# Patient Record
Sex: FEMALE | Race: WHITE | NOT HISPANIC OR LATINO | URBAN - METROPOLITAN AREA
[De-identification: names, ages, dates, MRNs, and addresses within clinical notes are randomized per-mention and may not be internally consistent; named-entity substitution may affect disease eponyms.]

---

## 2018-02-07 ENCOUNTER — HOSPITAL ENCOUNTER (EMERGENCY)
Facility: HOSPITAL | Age: 48
Discharge: HOME | End: 2018-02-07
Attending: EMERGENCY MEDICINE | Admitting: EMERGENCY MEDICINE
Payer: COMMERCIAL

## 2018-02-07 ENCOUNTER — APPOINTMENT (EMERGENCY)
Dept: RADIOLOGY | Facility: HOSPITAL | Age: 48
End: 2018-02-07
Payer: COMMERCIAL

## 2018-02-07 VITALS
OXYGEN SATURATION: 100 % | HEART RATE: 93 BPM | SYSTOLIC BLOOD PRESSURE: 112 MMHG | TEMPERATURE: 97 F | DIASTOLIC BLOOD PRESSURE: 72 MMHG | RESPIRATION RATE: 17 BRPM | WEIGHT: 162.48 LBS

## 2018-02-07 DIAGNOSIS — F11.23 HEROIN WITHDRAWAL (HCC): Primary | ICD-10-CM

## 2018-02-07 LAB
ANION GAP SERPL CALCULATED.3IONS-SCNC: 11 MMOL/L (ref 4–13)
ATRIAL RATE: 104 BPM
BASOPHILS # BLD AUTO: 0.02 THOUSANDS/ΜL (ref 0–0.1)
BASOPHILS NFR BLD AUTO: 0 % (ref 0–1)
BUN SERPL-MCNC: 19 MG/DL (ref 5–25)
CALCIUM SERPL-MCNC: 9.4 MG/DL (ref 8.3–10.1)
CHLORIDE SERPL-SCNC: 100 MMOL/L (ref 100–108)
CO2 SERPL-SCNC: 29 MMOL/L (ref 21–32)
CREAT SERPL-MCNC: 0.8 MG/DL (ref 0.6–1.3)
EOSINOPHIL # BLD AUTO: 0.08 THOUSAND/ΜL (ref 0–0.61)
EOSINOPHIL NFR BLD AUTO: 1 % (ref 0–6)
ERYTHROCYTE [DISTWIDTH] IN BLOOD BY AUTOMATED COUNT: 14.1 % (ref 11.6–15.1)
GFR SERPL CREATININE-BSD FRML MDRD: 87 ML/MIN/1.73SQ M
GLUCOSE SERPL-MCNC: 89 MG/DL (ref 65–140)
HCT VFR BLD AUTO: 42.8 % (ref 34.8–46.1)
HGB BLD-MCNC: 12.9 G/DL (ref 11.5–15.4)
LYMPHOCYTES # BLD AUTO: 1.6 THOUSANDS/ΜL (ref 0.6–4.47)
LYMPHOCYTES NFR BLD AUTO: 21 % (ref 14–44)
MCH RBC QN AUTO: 26.3 PG (ref 26.8–34.3)
MCHC RBC AUTO-ENTMCNC: 30.1 G/DL (ref 31.4–37.4)
MCV RBC AUTO: 87 FL (ref 82–98)
MONOCYTES # BLD AUTO: 1.01 THOUSAND/ΜL (ref 0.17–1.22)
MONOCYTES NFR BLD AUTO: 13 % (ref 4–12)
NEUTROPHILS # BLD AUTO: 5.04 THOUSANDS/ΜL (ref 1.85–7.62)
NEUTS SEG NFR BLD AUTO: 65 % (ref 43–75)
NRBC BLD AUTO-RTO: 0 /100 WBCS
PLATELET # BLD AUTO: 283 THOUSANDS/UL (ref 149–390)
PMV BLD AUTO: 9.9 FL (ref 8.9–12.7)
POTASSIUM SERPL-SCNC: 3.7 MMOL/L (ref 3.5–5.3)
PR INTERVAL: 180 MS
QRS AXIS: 22 DEGREES
QRSD INTERVAL: 118 MS
QT INTERVAL: 346 MS
QTC INTERVAL: 454 MS
RBC # BLD AUTO: 4.9 MILLION/UL (ref 3.81–5.12)
SODIUM SERPL-SCNC: 140 MMOL/L (ref 136–145)
T WAVE AXIS: -88 DEGREES
TROPONIN I SERPL-MCNC: <0.02 NG/ML
VENTRICULAR RATE: 104 BPM
WBC # BLD AUTO: 7.78 THOUSAND/UL (ref 4.31–10.16)

## 2018-02-07 PROCEDURE — 93005 ELECTROCARDIOGRAM TRACING: CPT

## 2018-02-07 PROCEDURE — 96374 THER/PROPH/DIAG INJ IV PUSH: CPT

## 2018-02-07 PROCEDURE — 93010 ELECTROCARDIOGRAM REPORT: CPT | Performed by: INTERNAL MEDICINE

## 2018-02-07 PROCEDURE — 71045 X-RAY EXAM CHEST 1 VIEW: CPT

## 2018-02-07 PROCEDURE — 84484 ASSAY OF TROPONIN QUANT: CPT | Performed by: EMERGENCY MEDICINE

## 2018-02-07 PROCEDURE — 96375 TX/PRO/DX INJ NEW DRUG ADDON: CPT

## 2018-02-07 PROCEDURE — 80048 BASIC METABOLIC PNL TOTAL CA: CPT | Performed by: EMERGENCY MEDICINE

## 2018-02-07 PROCEDURE — 96376 TX/PRO/DX INJ SAME DRUG ADON: CPT

## 2018-02-07 PROCEDURE — 99285 EMERGENCY DEPT VISIT HI MDM: CPT

## 2018-02-07 PROCEDURE — 85025 COMPLETE CBC W/AUTO DIFF WBC: CPT | Performed by: EMERGENCY MEDICINE

## 2018-02-07 PROCEDURE — 36415 COLL VENOUS BLD VENIPUNCTURE: CPT | Performed by: EMERGENCY MEDICINE

## 2018-02-07 RX ORDER — MYCOPHENOLATE MOFETIL 250 MG/1
1000 CAPSULE ORAL DAILY
COMMUNITY

## 2018-02-07 RX ORDER — CLONIDINE HYDROCHLORIDE 0.1 MG/1
0.2 TABLET ORAL ONCE
Status: COMPLETED | OUTPATIENT
Start: 2018-02-07 | End: 2018-02-07

## 2018-02-07 RX ORDER — FLUCONAZOLE 200 MG/1
200 TABLET ORAL DAILY
COMMUNITY

## 2018-02-07 RX ORDER — PREDNISONE 10 MG/1
TABLET ORAL DAILY
COMMUNITY

## 2018-02-07 RX ORDER — POTASSIUM CHLORIDE 20 MEQ/1
20 TABLET, EXTENDED RELEASE ORAL 2 TIMES DAILY
COMMUNITY

## 2018-02-07 RX ORDER — METHADONE HYDROCHLORIDE 10 MG/1
20 TABLET ORAL ONCE
Status: COMPLETED | OUTPATIENT
Start: 2018-02-07 | End: 2018-02-07

## 2018-02-07 RX ORDER — LAMOTRIGINE 200 MG/1
25 TABLET ORAL DAILY
COMMUNITY

## 2018-02-07 RX ORDER — DIPHENHYDRAMINE HYDROCHLORIDE 50 MG/ML
25 INJECTION INTRAMUSCULAR; INTRAVENOUS ONCE
Status: COMPLETED | OUTPATIENT
Start: 2018-02-07 | End: 2018-02-07

## 2018-02-07 RX ORDER — FUROSEMIDE 20 MG/1
20 TABLET ORAL DAILY
COMMUNITY

## 2018-02-07 RX ORDER — DIAZEPAM 5 MG/ML
2.5 INJECTION, SOLUTION INTRAMUSCULAR; INTRAVENOUS ONCE
Status: COMPLETED | OUTPATIENT
Start: 2018-02-07 | End: 2018-02-07

## 2018-02-07 RX ORDER — BUPROPION HYDROCHLORIDE 150 MG/1
150 TABLET, EXTENDED RELEASE ORAL 2 TIMES DAILY
COMMUNITY

## 2018-02-07 RX ORDER — LIOTHYRONINE SODIUM 5 UG/1
5 TABLET ORAL DAILY
COMMUNITY

## 2018-02-07 RX ORDER — VENLAFAXINE HYDROCHLORIDE 75 MG/1
75 CAPSULE, EXTENDED RELEASE ORAL DAILY
COMMUNITY

## 2018-02-07 RX ORDER — LEVOTHYROXINE AND LIOTHYRONINE 76; 18 UG/1; UG/1
120 TABLET ORAL DAILY
COMMUNITY

## 2018-02-07 RX ORDER — DIAZEPAM 5 MG/ML
5 INJECTION, SOLUTION INTRAMUSCULAR; INTRAVENOUS ONCE
Status: COMPLETED | OUTPATIENT
Start: 2018-02-07 | End: 2018-02-07

## 2018-02-07 RX ADMIN — DIAZEPAM 5 MG: 5 INJECTION, SOLUTION INTRAMUSCULAR; INTRAVENOUS at 04:48

## 2018-02-07 RX ADMIN — METHADONE HYDROCHLORIDE 20 MG: 10 TABLET ORAL at 04:48

## 2018-02-07 RX ADMIN — DIPHENHYDRAMINE HYDROCHLORIDE 25 MG: 50 INJECTION, SOLUTION INTRAMUSCULAR; INTRAVENOUS at 04:13

## 2018-02-07 RX ADMIN — DIAZEPAM 5 MG: 5 INJECTION, SOLUTION INTRAMUSCULAR; INTRAVENOUS at 06:11

## 2018-02-07 RX ADMIN — CLONIDINE HYDROCHLORIDE 0.2 MG: 0.1 TABLET ORAL at 04:02

## 2018-02-07 RX ADMIN — DIPHENHYDRAMINE HYDROCHLORIDE 25 MG: 50 INJECTION, SOLUTION INTRAMUSCULAR; INTRAVENOUS at 03:58

## 2018-02-07 RX ADMIN — DIAZEPAM 2.5 MG: 5 INJECTION, SOLUTION INTRAMUSCULAR; INTRAVENOUS at 04:11

## 2018-02-07 RX ADMIN — DIAZEPAM 2.5 MG: 5 INJECTION, SOLUTION INTRAMUSCULAR; INTRAVENOUS at 03:57

## 2018-02-07 NOTE — DISCHARGE INSTRUCTIONS
Opioid Withdrawal   WHAT YOU NEED TO KNOW:   Opioid withdrawal is a group of symptoms that occur when you suddenly decrease or stop taking opioids  Opioids include medicines to control pain, such as morphine and codeine, and illegal drugs, such as heroin  Withdrawal symptoms occur if you are physically dependent on opioids  Dependence means that your body gets used to how much medicine you take  This happens after you have used opioids regularly for a long time  Addiction means that a person uses opioids to get high instead of using them to control pain  DISCHARGE INSTRUCTIONS:   Medicines: You may  need any of the following:  · Opioid medicine  may still be needed if you have chronic pain  Your healthcare provider will tell you if you need to continue taking an opioid and explain how you should take it  · NSAIDs , such as ibuprofen, help decrease swelling, pain, and fever  This medicine is available with or without a doctor's order  NSAIDs can cause stomach bleeding or kidney problems in certain people  If you take blood thinner medicine, always ask your healthcare provider if NSAIDs are safe for you  Always read the medicine label and follow directions  · Blood pressure medicine  decreases symptoms of withdrawal, such as nausea, vomiting, muscle tension, and anxiety  · Antianxiety medicine  decreases anxiety and helps you feel calm and relaxed  · Antinausea medicine  helps calm your stomach and prevent vomiting  · Take your medicine as directed  Contact your healthcare provider if you think your medicine is not helping or if you have side effects  Tell him of her if you are allergic to any medicine  Keep a list of the medicines, vitamins, and herbs you take  Include the amounts, and when and why you take them  Bring the list or the pill bottles to follow-up visits  Carry your medicine list with you in case of an emergency  Follow up with your healthcare provider as directed:   You may need to return for other tests  You may also be referred to a specialty clinic to receive maintenance therapy medicine on a regular basis  Write down your questions so you remember to ask them during your visits  Psychological counseling and support:  Counseling and support may be provided to you if you are dependent on opioids  Healthcare providers will speak with you about your opioid use  They may also help you find resources for any daily living needs you have, such as housing or employment  Contact your healthcare provider if:   · You are about to run out of your opioid medicine  · You have nausea and vomiting  · You have questions or concerns about your condition or care  Return to the emergency department if:   · You have a fast heartbeat  · You have a seizure  © 2017 [address] Information is for End User's use only and may not be sold, redistributed or otherwise used for commercial purposes  All illustrations and images included in CareNotes® are the copyrighted property of A D A M , Inc  or Robb Gilliam  The above information is an  only  It is not intended as medical advice for individual conditions or treatments  Talk to your doctor, nurse or pharmacist before following any medical regimen to see if it is safe and effective for you  Narcotic Abuse   WHAT YOU NEED TO KNOW:   Narcotics are medicines used to decrease or take away severe pain  Narcotics may also be called opioids  Some common names of narcotics ordered by a doctor are codeine and morphine  Heroin is an illegal street drug that is made from morphine  DISCHARGE INSTRUCTIONS:   Seek care immediately if:   · You are very drowsy  · Your speech is slurred  · You have trouble thinking, remembering things, or focusing  Contact your healthcare provider if:   · You want help or information on how to stop using or abusing narcotics      Follow up with your healthcare provider as directed:  Write down your questions so you remember to ask them during your visits  Narcotic intoxication  usually lasts for several hours  You may have the following during or after you use narcotics:  · Behavior or mood changes, such as a great feeling followed by the feeling that you do not care about anyone or anything    · Trouble thinking, remembering things, or focusing    · Small pupils    · Feeling very drowsy    · Slurred speech  Narcotic withdrawal  occurs if you stop using narcotics after using them heavily over a period of time  Signs and symptoms may begin within minutes or days and continue for days or even months:  · Depression and anxiety    · Nausea or vomiting    · Muscle aches    · Watery eyes or runny nose    · Large pupils    · Sweating or goosebumps on your skin    · Diarrhea    · Fever    · Trouble sleeping  How narcotics can harm a pregnant woman and her baby:   · Tell your healthcare provider right away if you are trying to get pregnant or you are pregnant and you are using narcotics  Your doctor may suggest other medicines to control pain and prevent withdrawal  If you go through withdrawal while pregnant, you may miscarry your baby, or he may be stillborn  He may be very small and have other medical problems  · When your baby is born, he may show signs of withdrawal  This includes unexpected weight loss, poor feeding, and more crying than normal  Your baby may also have a fever, vomit, and have diarrhea  He may also have learning problems or other health issues when he gets older  If you have a baby and you are using narcotics, you may have trouble caring for your baby  Narcotics may be passed to your baby through breast milk  Talk to your healthcare provider before breastfeeding your baby if you are using narcotics    For support and more information:   · Substance Abuse and [address] , [address] [place]  Web Address: https://Drill Map/  · [facility] on Drug Abuse  [address] [place], [place] [address]  Phone: 5- 487 - 107-8742  Web Address: www arelis nih gov  © 2017 [address]  Information is for End User's use only and may not be sold, redistributed or otherwise used for commercial purposes  All illustrations and images included in CareNotes® are the copyrighted property of [employer]  or [address]  The above information is an  only  It is not intended as medical advice for individual conditions or treatments  Talk to your doctor, nurse or pharmacist before following any medical regimen to see if it is safe and effective for you

## 2018-02-07 NOTE — ED NOTES
Patient began to state that the medication was not working and she wanted to call her friend to go home  Dr Rita Lundborg made aware  Discussed with patient, [facility] and myself her options of staying to wait for crisis to come in the morning for resources for the patient or to set up rehab if the patient wished  Patient agreed to wait until morning        Arvind Richey RN  02/07/18 2019

## 2018-02-07 NOTE — ED NOTES
Pt asleep in bed at this time  Pt is twitching and mumbling in her sleep  No signs of distress noted at this time  Will continue to monitor        Savi Porras  02/07/18 0802

## 2018-02-07 NOTE — ED NOTES
Patient brought in by EMS  Patient reports she uses 16 bags of heroin a day  Patient reports last use was 18 hours ago  Patient states she does not want her family to know and she is unsure if she wants recovery        Gigi Cartwright RN  02/07/18 0941

## 2018-02-07 NOTE — ED PROVIDER NOTES
History  Chief Complaint   Patient presents with    Withdrawal - Drug     Patient brought in by EMS for heroin withdrawal, patient restless     64-year-old female presents with concerns for acute opiate withdrawal   Patient states she typically use numerous bags of heroin per day, has not used heroin in the past 18 hours  Patient typically snorts heroin  Patient notes severe restlessness, diaphoresis, dyskinesia, lacrimation, nausea, and abdominal cramping  Patient has a history of pulmonary fibrosis and is oxygen dependent on 4 L nasal cannula  Impression and plan:  Heroin withdrawal   Will treat patient symptomatically with benzodiazepines, diphenhydramine, and clonidine  I have asked patient if she wishes to go on to drug rehabilitation  She is currently contemplating this  Will check chest x-ray considering history of pulmonary fibrosis and evaluate with troponin for potential ACS is alternative of these are less likely considering history and evaluation completed thus far  Will check BMP to evaluate for potential renal dysfunction  Withdrawal - Drug   Severity:  Severe  Onset quality:  Gradual  Timing:  Constant  Chronicity:  New  Suspected agents:  Heroin  Associated symptoms: abdominal pain, nausea, palpitations, shortness of breath and weakness    Associated symptoms: no agitation, no blackouts, no bladder incontinence, no bowel incontinence, no confusion, no hallucinations, no headaches, no loss of consciousness, no seizures, no somnolence, no suicidal ideation, no violence and no vomiting        Prior to Admission Medications   Prescriptions Last Dose Informant Patient Reported? Taking?    buPROPion (WELLBUTRIN SR) 150 mg 12 hr tablet   Yes Yes   Sig: Take 150 mg by mouth 2 (two) times a day   fluconazole (DIFLUCAN) 200 mg tablet   Yes Yes   Sig: Take 200 mg by mouth daily   furosemide (LASIX) 20 mg tablet   Yes Yes   Sig: Take 20 mg by mouth daily   lamoTRIgine (LaMICtal) 200 MG tablet   Yes Yes   Sig: Take 25 mg by mouth daily   liothyronine (CYTOMEL) 5 mcg tablet   Yes Yes   Sig: Take 5 mcg by mouth daily   metFORMIN (GLUCOPHAGE) 500 mg tablet   Yes Yes   Sig: Take 500 mg by mouth 2 (two) times a day with meals   mycophenolate (CELLCEPT) 250 mg capsule   Yes Yes   Sig: Take 1,000 mg by mouth daily   potassium chloride (K-DUR,KLOR-CON) 20 mEq tablet   Yes Yes   Sig: Take 20 mEq by mouth 2 (two) times a day   predniSONE 10 mg tablet   Yes Yes   Sig: Take by mouth daily   thyroid (ARMOUR) 120 MG tablet   Yes Yes   Sig: Take 120 mg by mouth daily   venlafaxine (EFFEXOR-XR) 75 mg 24 hr capsule   Yes Yes   Sig: Take 75 mg by mouth daily      Facility-Administered Medications: None       Past Medical History:   Diagnosis Date    Disease of thyroid gland     Emphysema lung (HCC)     Pulmonary fibrosis ([facility] [address] )        Past Surgical History:   Procedure Laterality Date    HYSTERECTOMY         History reviewed  No pertinent family history  I have reviewed and agree with the history as documented  Social History   Substance Use Topics    Smoking status: Current Every Day Smoker     Types: Cigarettes    Smokeless tobacco: Never Used    Alcohol use No        Review of Systems   Respiratory: Positive for shortness of breath  Cardiovascular: Positive for palpitations  Gastrointestinal: Positive for abdominal pain and nausea  Negative for bowel incontinence and vomiting  Genitourinary: Negative for bladder incontinence  Neurological: Positive for weakness  Negative for seizures, loss of consciousness and headaches  Psychiatric/Behavioral: Negative for agitation, confusion, hallucinations and suicidal ideas         Physical Exam  ED Triage Vitals   Temperature Pulse Respirations Blood Pressure SpO2   02/07/18 0451 02/07/18 0346 02/07/18 0346 02/07/18 0346 02/07/18 0346   (!) 97 °F (36 1 °C) (!) 114 22 (!) 184/94 100 %      Temp Source Heart Rate Source Patient Position - Orthostatic VS BP Location FiO2 (%)   02/07/18 0451 02/07/18 0346 02/07/18 0346 02/07/18 0346 --   Axillary Monitor Lying Right arm       Pain Score       02/07/18 0603       No Pain           Orthostatic Vital Signs  Vitals:    02/07/18 0703 02/07/18 0736 02/07/18 0800 02/07/18 0855   BP: 122/75 140/73 122/77 112/72   Pulse: 81 83 84 93   Patient Position - Orthostatic VS: Lying Lying Lying Lying       Physical Exam   Constitutional: She is oriented to person, place, and time  She appears well-developed and well-nourished  She appears distressed  HENT:   Head: Normocephalic  Mouth/Throat: Oropharynx is clear and moist    Eyes: Pupils are equal, round, and reactive to light  Neck: Normal range of motion  Neck supple  Cardiovascular: Regular rhythm  Tachycardic  Pulmonary/Chest: Effort normal and breath sounds normal  No respiratory distress  She has no wheezes  Abdominal: Soft  Bowel sounds are normal  She exhibits no distension  There is no tenderness  Musculoskeletal: She exhibits no edema  Neurological: She is alert and oriented to person, place, and time  She displays normal reflexes  No sensory deficit  She exhibits normal muscle tone  Restless  Grossly normal motor function with no focal deficits  No clonus rigidity  Skin: Skin is warm  She is diaphoretic  Piloerection  Psychiatric: Her mood appears anxious  Vitals reviewed        ED Medications  Medications   cloNIDine (CATAPRES) tablet 0 2 mg (0 2 mg Oral Given 2/7/18 0402)   diazepam (VALIUM) injection 2 5 mg (2 5 mg Intravenous Given 2/7/18 0357)   diphenhydrAMINE (BENADRYL) injection 25 mg (25 mg Intravenous Given 2/7/18 0358)    EMS REPLENISHMENT MED ( Does not apply Given to EMS 2/7/18 0423)   diazepam (VALIUM) injection 2 5 mg (2 5 mg Intravenous Given 2/7/18 0411)   diphenhydrAMINE (BENADRYL) injection 25 mg (25 mg Intravenous Given 2/7/18 0413)   methadone (DOLOPHINE) tablet 20 mg (20 mg Oral Given 2/7/18 5428)   diazepam (VALIUM) injection 5 mg (5 mg Intravenous Given 2/7/18 3738)   diazepam (VALIUM) injection 5 mg (5 mg Intravenous Given 2/7/18 0611)       Diagnostic Studies  Results Reviewed     Procedure Component Value Units Date/Time    Troponin I [[id number]]  (Normal) Collected:  02/07/18 0406    Lab Status:  Final result Specimen:  Blood from Arm, Left Updated:  02/07/18 0437     Troponin I <0 02 ng/mL     Narrative:         [employer] Chemistry analyzer 99% cutoff is > 0 04 ng/mL in network labs    o cTnI 99% cutoff is useful only when applied to patients in the clinical setting of myocardial ischemia  o cTnI 99% cutoff should be interpreted in the context of clinical history, ECG findings and possibly cardiac imaging to establish correct diagnosis  o cTnI 99% cutoff may be suggestive but clearly not indicative of a coronary event without the clinical setting of myocardial ischemia  Basic metabolic panel [[id number]] Collected:  02/07/18 0406    Lab Status:  Final result Specimen:  Blood from Arm, Left Updated:  02/07/18 0428     Sodium 140 mmol/L      Potassium 3 7 mmol/L      Chloride 100 mmol/L      CO2 29 mmol/L      Anion Gap 11 mmol/L      BUN 19 mg/dL      Creatinine 0 80 mg/dL      Glucose 89 mg/dL      Calcium 9 4 mg/dL      eGFR 87 ml/min/1 73sq m     Narrative:         National Kidney Disease Education Program recommendations are as follows:  GFR calculation is accurate only with a steady state creatinine  Chronic Kidney disease less than 60 ml/min/1 73 sq  meters  Kidney failure less than 15 ml/min/1 73 sq  meters      CBC and differential [[id number]]  (Abnormal) Collected:  02/07/18 0406    Lab Status:  Final result Specimen:  Blood from Arm, Left Updated:  02/07/18 0417     WBC 7 78 Thousand/uL      RBC 4 90 Million/uL      Hemoglobin 12 9 g/dL      Hematocrit 42 8 %      MCV 87 fL      MCH 26 3 (L) pg      MCHC 30 1 (L) g/dL      RDW 14 1 %      MPV 9 9 fL      Platelets 556 Thousands/uL      nRBC 0 /100 WBCs Neutrophils Relative 65 %      Lymphocytes Relative 21 %      Monocytes Relative 13 (H) %      Eosinophils Relative 1 %      Basophils Relative 0 %      Neutrophils Absolute 5 04 Thousands/µL      Lymphocytes Absolute 1 60 Thousands/µL      Monocytes Absolute 1 01 Thousand/µL      Eosinophils Absolute 0 08 Thousand/µL      Basophils Absolute 0 02 Thousands/µL                  XR chest portable   Final Result by Carmenza Cancino MD (02/07 0740)      Limited examination demonstrating parenchymal airspace opacity in the left chest in the small left pleural effusion as well as interstitial prominence in the right lung with blunting in right costophrenic angle  Repeat evaluation with PA and left    lateral chest radiographs when possible is recommended  Workstation performed: [id number]                    Procedures  Procedures       Phone Contacts  ED Phone Contact    ED Course  ED Course as of Feb 08 0348   Wed Feb 07, 2018   0410   EKG demonstrates sinus tachycardia rate of 104  Normal QTC  Variable baseline due to patient's restlessness  No clear T-wave or ST changes notable on the EKG  1849   Patient with persistent symptoms despite multiple medications, will administer single dose methadone 20 mg oral after patient agreed to talk with crisis about potential options regarding rehabilitation  Patient states that she did take Suboxone prior to coming to the emergency room  which is likely exacerbating her symptoms  MDM  CritCare Time    Disposition  Final diagnoses:   Heroin withdrawal ([address] )     Time reflects when diagnosis was documented in both MDM as applicable and the Disposition within this note     Time User Action Codes Description Comment    2/7/2018  8:11 AM Tsosie Finder Add [P08 18] Heroin withdrawal [facility])       ED Disposition     ED Disposition Condition Comment    Discharge  Alysia Salas discharge to home/self care      Condition at discharge: Stable        MD Documentation    Alexandria Maxwell Most Recent Value   Sending MD Dr Nelly Blackwell up With Specialties Details Why Contact Info Additional Information    Infolink  Call To establish care with a primary care doctor if you do not already have one [phone]       Drug rehab  Call  using list of outpatient drug rehab resources provided by crisis worker     [place] [facility] Emergency Department Emergency Medicine Go to If symptoms worsen [address] [facility] [id number] [phone] [place] ED, [address] [place] [address], [id number]        Discharge Medication List as of 2/7/2018  8:46 AM      CONTINUE these medications which have NOT CHANGED    Details   buPROPion (WELLBUTRIN SR) 150 mg 12 hr tablet Take 150 mg by mouth 2 (two) times a day, Historical Med      fluconazole (DIFLUCAN) 200 mg tablet Take 200 mg by mouth daily, Historical Med      furosemide (LASIX) 20 mg tablet Take 20 mg by mouth daily, Historical Med      lamoTRIgine (LaMICtal) 200 MG tablet Take 25 mg by mouth daily, Historical Med      liothyronine (CYTOMEL) 5 mcg tablet Take 5 mcg by mouth daily, Historical Med      metFORMIN (GLUCOPHAGE) 500 mg tablet Take 500 mg by mouth 2 (two) times a day with meals, Historical Med      mycophenolate (CELLCEPT) 250 mg capsule Take 1,000 mg by mouth daily, Historical Med      potassium chloride (K-DUR,KLOR-CON) 20 mEq tablet Take 20 mEq by mouth 2 (two) times a day, Historical Med      predniSONE 10 mg tablet Take by mouth daily, Historical Med      thyroid (ARMOUR) 120 MG tablet Take 120 mg by mouth daily, Historical Med      venlafaxine (EFFEXOR-XR) 75 mg 24 hr capsule Take 75 mg by mouth daily, Historical Med           No discharge procedures on file      ED Provider  Electronically Signed by           Marlon Puentes MD  02/08/18 8764

## 2018-02-07 NOTE — ED NOTES
Took over one to one observation from PCA  Pt is asleep at this time  No signs of distress noted  Will continue to monitor        Lisa Leija  02/07/18 2279

## 2018-02-07 NOTE — ED NOTES
Pt is a 50 y o  female who presented to the ED with heroin withdrawal, indicating that she has been snorting heroin daily over the past several months  Pt admits that she was a heavy user in her 19's but hadn't used in 20+ years, and ran into a friend a few months ago  Pt reports that her  is unaware of her using, and states "he will not take this well if he finds out"  Pt admits to getting Soboxone off of the street  Pt denies SI/HI and there is no psychosis present  Pt also denies a hx of MH tx of any kind  Pt has been to rehab in the past at [facility]  Discussed options with the pt at this time, and she is declining inpatient detox/rehab  Pt will be d/c home with the recommendation to f/u with D&A tx      Chief Complaint   Patient presents with    Withdrawal - Drug     Patient brought in by EMS for heroin withdrawal, patient restless     Intake Assessment completed, Safety risk Assessment completed    Aimee Peterson, KAMLESH  02/07/18 2000

## 2018-03-31 ENCOUNTER — APPOINTMENT (EMERGENCY)
Dept: CT IMAGING | Facility: HOSPITAL | Age: 48
DRG: 896 | End: 2018-03-31
Payer: MEDICARE

## 2018-03-31 ENCOUNTER — APPOINTMENT (EMERGENCY)
Dept: RADIOLOGY | Facility: HOSPITAL | Age: 48
DRG: 896 | End: 2018-03-31
Payer: MEDICARE

## 2018-03-31 ENCOUNTER — HOSPITAL ENCOUNTER (INPATIENT)
Facility: HOSPITAL | Age: 48
LOS: 3 days | Discharge: HOME | DRG: 896 | End: 2018-04-03
Attending: EMERGENCY MEDICINE | Admitting: ANESTHESIOLOGY
Payer: MEDICARE

## 2018-03-31 DIAGNOSIS — R45.1 RESTLESSNESS: ICD-10-CM

## 2018-03-31 DIAGNOSIS — F11.23 OPIOID DEPENDENCE WITH WITHDRAWAL (HCC): ICD-10-CM

## 2018-03-31 DIAGNOSIS — R79.89 LOW SERUM THYROID STIMULATING HORMONE (TSH): Primary | ICD-10-CM

## 2018-03-31 DIAGNOSIS — R79.89 ABNORMAL TSH: ICD-10-CM

## 2018-03-31 DIAGNOSIS — E87.2 METABOLIC ACIDOSIS: ICD-10-CM

## 2018-03-31 DIAGNOSIS — R00.0 SINUS TACHYCARDIA: ICD-10-CM

## 2018-03-31 DIAGNOSIS — G93.40 ACUTE ENCEPHALOPATHY: ICD-10-CM

## 2018-03-31 LAB
ALBUMIN SERPL BCP-MCNC: 3.2 G/DL (ref 3.5–5)
ALP SERPL-CCNC: 76 U/L (ref 46–116)
ALT SERPL W P-5'-P-CCNC: 27 U/L (ref 12–78)
AMMONIA PLAS-SCNC: <10 UMOL/L (ref 11–35)
AMPHETAMINES SERPL QL SCN: NEGATIVE
ANION GAP SERPL CALCULATED.3IONS-SCNC: 19 MMOL/L (ref 4–13)
ANION GAP SERPL CALCULATED.3IONS-SCNC: 8 MMOL/L (ref 4–13)
APAP SERPL-MCNC: <2 UG/ML (ref 10–30)
APTT PPP: 26 SECONDS (ref 23–35)
APTT PPP: 27 SECONDS (ref 23–35)
ARTERIAL PATENCY WRIST A: YES
AST SERPL W P-5'-P-CCNC: 20 U/L (ref 5–45)
BARBITURATES UR QL: NEGATIVE
BASE EX.OXY STD BLDV CALC-SCNC: 94.4 % (ref 60–80)
BASE EXCESS BLDA CALC-SCNC: 1.1 MMOL/L
BASE EXCESS BLDV CALC-SCNC: -8.3 MMOL/L
BASOPHILS # BLD AUTO: 0.02 THOUSANDS/ΜL (ref 0–0.1)
BASOPHILS NFR BLD AUTO: 0 % (ref 0–1)
BENZODIAZ UR QL: NEGATIVE
BILIRUB SERPL-MCNC: 0.2 MG/DL (ref 0.2–1)
BILIRUB UR QL STRIP: NEGATIVE
BLD SMEAR INTERP: NORMAL
BODY TEMPERATURE: 98.5 DEGREES FEHRENHEIT
BUN SERPL-MCNC: 9 MG/DL (ref 5–25)
BUN SERPL-MCNC: 9 MG/DL (ref 5–25)
CALCIUM SERPL-MCNC: 7.1 MG/DL (ref 8.3–10.1)
CALCIUM SERPL-MCNC: 8.8 MG/DL (ref 8.3–10.1)
CHLORIDE SERPL-SCNC: 101 MMOL/L (ref 100–108)
CHLORIDE SERPL-SCNC: 109 MMOL/L (ref 100–108)
CK SERPL-CCNC: 70 U/L (ref 26–192)
CLARITY UR: CLEAR
CO2 SERPL-SCNC: 20 MMOL/L (ref 21–32)
CO2 SERPL-SCNC: 25 MMOL/L (ref 21–32)
COCAINE UR QL: NEGATIVE
COLOR UR: YELLOW
CREAT SERPL-MCNC: 0.45 MG/DL (ref 0.6–1.3)
CREAT SERPL-MCNC: 0.88 MG/DL (ref 0.6–1.3)
DEPRECATED D DIMER PPP: 1018 NG/ML (FEU) (ref 0–424)
EOSINOPHIL # BLD AUTO: 0.03 THOUSAND/ΜL (ref 0–0.61)
EOSINOPHIL NFR BLD AUTO: 0 % (ref 0–6)
ERYTHROCYTE [DISTWIDTH] IN BLOOD BY AUTOMATED COUNT: 16.2 % (ref 11.6–15.1)
ETHANOL SERPL-MCNC: <3 MG/DL (ref 0–3)
EXT PREG TEST URINE: NEGATIVE
GFR SERPL CREATININE-BSD FRML MDRD: 118 ML/MIN/1.73SQ M
GFR SERPL CREATININE-BSD FRML MDRD: 77 ML/MIN/1.73SQ M
GLUCOSE SERPL-MCNC: 158 MG/DL (ref 65–140)
GLUCOSE SERPL-MCNC: 89 MG/DL (ref 65–140)
GLUCOSE UR STRIP-MCNC: NEGATIVE MG/DL
HCO3 BLDA-SCNC: 25.6 MMOL/L (ref 22–28)
HCO3 BLDV-SCNC: 13.5 MMOL/L (ref 24–30)
HCT VFR BLD AUTO: 40.1 % (ref 34.8–46.1)
HGB BLD-MCNC: 12.3 G/DL (ref 11.5–15.4)
HGB UR QL STRIP.AUTO: NEGATIVE
HOLD SPECIMEN: NORMAL
HOROWITZ INDEX BLDA+IHG-RTO: 40 MM[HG]
I-TIME: 1
INR PPP: 0.95 (ref 0.86–1.16)
INR PPP: 1.09 (ref 0.86–1.16)
KETONES UR STRIP-MCNC: NEGATIVE MG/DL
LACTATE SERPL-SCNC: 1.3 MMOL/L (ref 0.5–2)
LACTATE SERPL-SCNC: 9 MMOL/L (ref 0.5–2)
LDH SERPL-CCNC: 360 U/L (ref 81–234)
LEUKOCYTE ESTERASE UR QL STRIP: NEGATIVE
LITHIUM SERPL-SCNC: <0.2 MMOL/L (ref 0.5–1)
LYMPHOCYTES # BLD AUTO: 0.86 THOUSANDS/ΜL (ref 0.6–4.47)
LYMPHOCYTES NFR BLD AUTO: 10 % (ref 14–44)
MAGNESIUM SERPL-MCNC: 1.6 MG/DL (ref 1.6–2.6)
MCH RBC QN AUTO: 27.5 PG (ref 26.8–34.3)
MCHC RBC AUTO-ENTMCNC: 30.7 G/DL (ref 31.4–37.4)
MCV RBC AUTO: 90 FL (ref 82–98)
METHADONE UR QL: NEGATIVE
MONOCYTES # BLD AUTO: 0.21 THOUSAND/ΜL (ref 0.17–1.22)
MONOCYTES NFR BLD AUTO: 2 % (ref 4–12)
NEUTROPHILS # BLD AUTO: 7.89 THOUSANDS/ΜL (ref 1.85–7.62)
NEUTS SEG NFR BLD AUTO: 87 % (ref 43–75)
NITRITE UR QL STRIP: NEGATIVE
NRBC BLD AUTO-RTO: 0 /100 WBCS
O2 CT BLDA-SCNC: 14.9 ML/DL (ref 16–23)
O2 CT BLDV-SCNC: 16.6 ML/DL
OPIATES UR QL SCN: POSITIVE
OSMOLALITY UR/SERPL-RTO: 293 MMOL/KG (ref 282–298)
OXYHGB MFR BLDA: 98.3 % (ref 94–97)
PCO2 BLDA: 40 MM HG (ref 36–44)
PCO2 BLDV: 20 MM HG (ref 42–50)
PCP UR QL: NEGATIVE
PEEP RESPIRATORY: 5 CM[H2O]
PH BLDA: 7.42 [PH] (ref 7.35–7.45)
PH BLDV: 7.45 [PH] (ref 7.3–7.4)
PH UR STRIP.AUTO: 5.5 [PH] (ref 4.5–8)
PLATELET # BLD AUTO: 263 THOUSANDS/UL (ref 149–390)
PLATELET # BLD AUTO: 373 THOUSANDS/UL (ref 149–390)
PMV BLD AUTO: 10.2 FL (ref 8.9–12.7)
PMV BLD AUTO: 10.5 FL (ref 8.9–12.7)
PO2 BLDA: 142.7 MM HG (ref 75–129)
PO2 BLDV: 114.9 MM HG (ref 35–45)
POTASSIUM SERPL-SCNC: 3.9 MMOL/L (ref 3.5–5.3)
POTASSIUM SERPL-SCNC: 4.5 MMOL/L (ref 3.5–5.3)
PROT SERPL-MCNC: 6.9 G/DL (ref 6.4–8.2)
PROT UR STRIP-MCNC: NEGATIVE MG/DL
PROTHROMBIN TIME: 12.8 SECONDS (ref 12.1–14.4)
PROTHROMBIN TIME: 14.3 SECONDS (ref 12.1–14.4)
RBC # BLD AUTO: 4.47 MILLION/UL (ref 3.81–5.12)
SALICYLATES SERPL-MCNC: <3 MG/DL (ref 3–20)
SODIUM SERPL-SCNC: 140 MMOL/L (ref 136–145)
SODIUM SERPL-SCNC: 142 MMOL/L (ref 136–145)
SP GR UR STRIP.AUTO: 1.01 (ref 1–1.03)
SPECIMEN SOURCE: ABNORMAL
T3FREE SERPL-MCNC: 7.38 PG/ML (ref 2.3–4.2)
T4 FREE SERPL-MCNC: 1.38 NG/DL (ref 0.76–1.46)
T4 FREE SERPL-MCNC: 1.5 NG/DL (ref 0.76–1.46)
T4 SERPL-MCNC: 10.2 UG/DL (ref 4.7–13.3)
THC UR QL: NEGATIVE
TROPONIN I SERPL-MCNC: <0.02 NG/ML
TSH SERPL DL<=0.05 MIU/L-ACNC: <0.007 UIU/ML (ref 0.36–3.74)
UROBILINOGEN UR QL STRIP.AUTO: 0.2 E.U./DL
VENT AC: 22
VENT- AC: AC
VT SETTING VENT: 350 ML
WBC # BLD AUTO: 9.05 THOUSAND/UL (ref 4.31–10.16)

## 2018-03-31 PROCEDURE — 82805 BLOOD GASES W/O2 SATURATION: CPT | Performed by: PHYSICIAN ASSISTANT

## 2018-03-31 PROCEDURE — 80329 ANALGESICS NON-OPIOID 1 OR 2: CPT | Performed by: EMERGENCY MEDICINE

## 2018-03-31 PROCEDURE — 96372 THER/PROPH/DIAG INJ SC/IM: CPT

## 2018-03-31 PROCEDURE — 82805 BLOOD GASES W/O2 SATURATION: CPT | Performed by: EMERGENCY MEDICINE

## 2018-03-31 PROCEDURE — 5A1935Z RESPIRATORY VENTILATION, LESS THAN 24 CONSECUTIVE HOURS: ICD-10-PCS | Performed by: GENERAL PRACTICE

## 2018-03-31 PROCEDURE — 84439 ASSAY OF FREE THYROXINE: CPT | Performed by: EMERGENCY MEDICINE

## 2018-03-31 PROCEDURE — 0BH17EZ INSERTION OF ENDOTRACHEAL AIRWAY INTO TRACHEA, VIA NATURAL OR ARTIFICIAL OPENING: ICD-10-PCS | Performed by: GENERAL PRACTICE

## 2018-03-31 PROCEDURE — 36415 COLL VENOUS BLD VENIPUNCTURE: CPT | Performed by: EMERGENCY MEDICINE

## 2018-03-31 PROCEDURE — 74176 CT ABD & PELVIS W/O CONTRAST: CPT

## 2018-03-31 PROCEDURE — 84436 ASSAY OF TOTAL THYROXINE: CPT | Performed by: ANESTHESIOLOGY

## 2018-03-31 PROCEDURE — 82140 ASSAY OF AMMONIA: CPT | Performed by: EMERGENCY MEDICINE

## 2018-03-31 PROCEDURE — 85025 COMPLETE CBC W/AUTO DIFF WBC: CPT | Performed by: EMERGENCY MEDICINE

## 2018-03-31 PROCEDURE — 86800 THYROGLOBULIN ANTIBODY: CPT | Performed by: ANESTHESIOLOGY

## 2018-03-31 PROCEDURE — 85730 THROMBOPLASTIN TIME PARTIAL: CPT | Performed by: PHYSICIAN ASSISTANT

## 2018-03-31 PROCEDURE — 70450 CT HEAD/BRAIN W/O DYE: CPT

## 2018-03-31 PROCEDURE — 85379 FIBRIN DEGRADATION QUANT: CPT | Performed by: PHYSICIAN ASSISTANT

## 2018-03-31 PROCEDURE — 80307 DRUG TEST PRSMV CHEM ANLYZR: CPT | Performed by: EMERGENCY MEDICINE

## 2018-03-31 PROCEDURE — 80178 ASSAY OF LITHIUM: CPT | Performed by: PHYSICIAN ASSISTANT

## 2018-03-31 PROCEDURE — 93005 ELECTROCARDIOGRAM TRACING: CPT

## 2018-03-31 PROCEDURE — 84443 ASSAY THYROID STIM HORMONE: CPT | Performed by: EMERGENCY MEDICINE

## 2018-03-31 PROCEDURE — 94760 N-INVAS EAR/PLS OXIMETRY 1: CPT

## 2018-03-31 PROCEDURE — 71045 X-RAY EXAM CHEST 1 VIEW: CPT

## 2018-03-31 PROCEDURE — 83615 LACTATE (LD) (LDH) ENZYME: CPT | Performed by: PHYSICIAN ASSISTANT

## 2018-03-31 PROCEDURE — 99291 CRITICAL CARE FIRST HOUR: CPT

## 2018-03-31 PROCEDURE — 96366 THER/PROPH/DIAG IV INF ADDON: CPT

## 2018-03-31 PROCEDURE — 81025 URINE PREGNANCY TEST: CPT | Performed by: EMERGENCY MEDICINE

## 2018-03-31 PROCEDURE — 85420 FIBRINOLYTIC PLASMINOGEN: CPT | Performed by: PHYSICIAN ASSISTANT

## 2018-03-31 PROCEDURE — 85362 FIBRIN DEGRADATION PRODUCTS: CPT | Performed by: PHYSICIAN ASSISTANT

## 2018-03-31 PROCEDURE — 80048 BASIC METABOLIC PNL TOTAL CA: CPT | Performed by: PHYSICIAN ASSISTANT

## 2018-03-31 PROCEDURE — 81003 URINALYSIS AUTO W/O SCOPE: CPT | Performed by: EMERGENCY MEDICINE

## 2018-03-31 PROCEDURE — 85610 PROTHROMBIN TIME: CPT | Performed by: PHYSICIAN ASSISTANT

## 2018-03-31 PROCEDURE — 72125 CT NECK SPINE W/O DYE: CPT

## 2018-03-31 PROCEDURE — 83874 ASSAY OF MYOGLOBIN: CPT | Performed by: PHYSICIAN ASSISTANT

## 2018-03-31 PROCEDURE — 85049 AUTOMATED PLATELET COUNT: CPT | Performed by: PHYSICIAN ASSISTANT

## 2018-03-31 PROCEDURE — 84484 ASSAY OF TROPONIN QUANT: CPT | Performed by: EMERGENCY MEDICINE

## 2018-03-31 PROCEDURE — 94002 VENT MGMT INPAT INIT DAY: CPT

## 2018-03-31 PROCEDURE — 85730 THROMBOPLASTIN TIME PARTIAL: CPT | Performed by: EMERGENCY MEDICINE

## 2018-03-31 PROCEDURE — 84439 ASSAY OF FREE THYROXINE: CPT | Performed by: PHYSICIAN ASSISTANT

## 2018-03-31 PROCEDURE — 85384 FIBRINOGEN ACTIVITY: CPT | Performed by: PHYSICIAN ASSISTANT

## 2018-03-31 PROCEDURE — 96365 THER/PROPH/DIAG IV INF INIT: CPT

## 2018-03-31 PROCEDURE — 84481 FREE ASSAY (FT-3): CPT | Performed by: ANESTHESIOLOGY

## 2018-03-31 PROCEDURE — 82550 ASSAY OF CK (CPK): CPT | Performed by: EMERGENCY MEDICINE

## 2018-03-31 PROCEDURE — 80320 DRUG SCREEN QUANTALCOHOLS: CPT | Performed by: EMERGENCY MEDICINE

## 2018-03-31 PROCEDURE — 96375 TX/PRO/DX INJ NEW DRUG ADDON: CPT

## 2018-03-31 PROCEDURE — 83930 ASSAY OF BLOOD OSMOLALITY: CPT | Performed by: EMERGENCY MEDICINE

## 2018-03-31 PROCEDURE — 83605 ASSAY OF LACTIC ACID: CPT | Performed by: EMERGENCY MEDICINE

## 2018-03-31 PROCEDURE — 82533 TOTAL CORTISOL: CPT | Performed by: PHYSICIAN ASSISTANT

## 2018-03-31 PROCEDURE — 87040 BLOOD CULTURE FOR BACTERIA: CPT | Performed by: EMERGENCY MEDICINE

## 2018-03-31 PROCEDURE — 71250 CT THORAX DX C-: CPT

## 2018-03-31 PROCEDURE — 83735 ASSAY OF MAGNESIUM: CPT | Performed by: EMERGENCY MEDICINE

## 2018-03-31 PROCEDURE — 85610 PROTHROMBIN TIME: CPT | Performed by: EMERGENCY MEDICINE

## 2018-03-31 PROCEDURE — 85300 ANTITHROMBIN III ACTIVITY: CPT | Performed by: PHYSICIAN ASSISTANT

## 2018-03-31 PROCEDURE — 80053 COMPREHEN METABOLIC PANEL: CPT | Performed by: EMERGENCY MEDICINE

## 2018-03-31 RX ORDER — PROPOFOL 10 MG/ML
5-50 INJECTION, EMULSION INTRAVENOUS
Status: DISCONTINUED | OUTPATIENT
Start: 2018-03-31 | End: 2018-04-01

## 2018-03-31 RX ORDER — HALOPERIDOL 5 MG/ML
5 INJECTION INTRAMUSCULAR ONCE
Status: DISCONTINUED | OUTPATIENT
Start: 2018-03-31 | End: 2018-03-31

## 2018-03-31 RX ORDER — DIAZEPAM 5 MG/ML
5 INJECTION, SOLUTION INTRAMUSCULAR; INTRAVENOUS
Status: DISCONTINUED | OUTPATIENT
Start: 2018-03-31 | End: 2018-04-02

## 2018-03-31 RX ORDER — HALOPERIDOL 5 MG/ML
5 INJECTION INTRAMUSCULAR ONCE
Status: COMPLETED | OUTPATIENT
Start: 2018-03-31 | End: 2018-03-31

## 2018-03-31 RX ORDER — OLANZAPINE 10 MG/1
10 INJECTION, POWDER, LYOPHILIZED, FOR SOLUTION INTRAMUSCULAR ONCE
Status: COMPLETED | OUTPATIENT
Start: 2018-03-31 | End: 2018-03-31

## 2018-03-31 RX ORDER — PROPRANOLOL HYDROCHLORIDE 20 MG/1
20 TABLET ORAL EVERY 12 HOURS SCHEDULED
Status: DISCONTINUED | OUTPATIENT
Start: 2018-03-31 | End: 2018-04-01

## 2018-03-31 RX ORDER — LORAZEPAM 2 MG/ML
1 INJECTION INTRAMUSCULAR ONCE
Status: COMPLETED | OUTPATIENT
Start: 2018-03-31 | End: 2018-03-31

## 2018-03-31 RX ORDER — ETOMIDATE 2 MG/ML
20 INJECTION INTRAVENOUS ONCE
Status: COMPLETED | OUTPATIENT
Start: 2018-03-31 | End: 2018-03-31

## 2018-03-31 RX ORDER — LABETALOL HYDROCHLORIDE 5 MG/ML
20 INJECTION, SOLUTION INTRAVENOUS ONCE
Status: COMPLETED | OUTPATIENT
Start: 2018-03-31 | End: 2018-03-31

## 2018-03-31 RX ORDER — HEPARIN SODIUM 5000 [USP'U]/ML
5000 INJECTION, SOLUTION INTRAVENOUS; SUBCUTANEOUS EVERY 8 HOURS SCHEDULED
Status: DISCONTINUED | OUTPATIENT
Start: 2018-03-31 | End: 2018-04-03 | Stop reason: HOSPADM

## 2018-03-31 RX ORDER — PROPOFOL 10 MG/ML
50 INJECTION, EMULSION INTRAVENOUS ONCE
Status: COMPLETED | OUTPATIENT
Start: 2018-03-31 | End: 2018-03-31

## 2018-03-31 RX ORDER — PROPOFOL 10 MG/ML
INJECTION, EMULSION INTRAVENOUS
Status: COMPLETED
Start: 2018-03-31 | End: 2018-03-31

## 2018-03-31 RX ORDER — SUCCINYLCHOLINE/SOD CL,ISO/PF 100 MG/5ML
100 SYRINGE (ML) INTRAVENOUS ONCE
Status: COMPLETED | OUTPATIENT
Start: 2018-03-31 | End: 2018-03-31

## 2018-03-31 RX ORDER — LABETALOL HYDROCHLORIDE 5 MG/ML
INJECTION, SOLUTION INTRAVENOUS
Status: COMPLETED
Start: 2018-03-31 | End: 2018-03-31

## 2018-03-31 RX ORDER — CHLORHEXIDINE GLUCONATE 0.12 MG/ML
15 RINSE ORAL EVERY 12 HOURS SCHEDULED
Status: DISCONTINUED | OUTPATIENT
Start: 2018-03-31 | End: 2018-04-01

## 2018-03-31 RX ORDER — ZIPRASIDONE MESYLATE 20 MG/ML
20 INJECTION, POWDER, LYOPHILIZED, FOR SOLUTION INTRAMUSCULAR ONCE
Status: COMPLETED | OUTPATIENT
Start: 2018-03-31 | End: 2018-03-31

## 2018-03-31 RX ORDER — VECURONIUM BROMIDE 1 MG/ML
10 INJECTION, POWDER, LYOPHILIZED, FOR SOLUTION INTRAVENOUS ONCE
Status: COMPLETED | OUTPATIENT
Start: 2018-03-31 | End: 2018-03-31

## 2018-03-31 RX ORDER — CHLORHEXIDINE GLUCONATE 0.12 MG/ML
15 RINSE ORAL EVERY 12 HOURS SCHEDULED
Status: DISCONTINUED | OUTPATIENT
Start: 2018-03-31 | End: 2018-03-31 | Stop reason: SDUPTHER

## 2018-03-31 RX ORDER — MINERAL OIL AND PETROLATUM 150; 830 MG/G; MG/G
OINTMENT OPHTHALMIC
Status: DISCONTINUED | OUTPATIENT
Start: 2018-03-31 | End: 2018-04-01

## 2018-03-31 RX ADMIN — LORAZEPAM 1 MG: 2 INJECTION INTRAMUSCULAR; INTRAVENOUS at 17:55

## 2018-03-31 RX ADMIN — OLANZAPINE 10 MG: 10 INJECTION, POWDER, FOR SOLUTION INTRAMUSCULAR at 17:56

## 2018-03-31 RX ADMIN — WATER 10 ML: 1 INJECTION INTRAMUSCULAR; INTRAVENOUS; SUBCUTANEOUS at 18:42

## 2018-03-31 RX ADMIN — DEXMEDETOMIDINE HYDROCHLORIDE 0.6 MCG/KG/HR: 100 INJECTION, SOLUTION INTRAVENOUS at 21:59

## 2018-03-31 RX ADMIN — ETOMIDATE 20 MG: 2 INJECTION, SOLUTION INTRAVENOUS at 18:50

## 2018-03-31 RX ADMIN — PROPOFOL 100 MCG/KG/MIN: 10 INJECTION, EMULSION INTRAVENOUS at 22:00

## 2018-03-31 RX ADMIN — ZIPRASIDONE MESYLATE 20 MG: 20 INJECTION, POWDER, LYOPHILIZED, FOR SOLUTION INTRAMUSCULAR at 18:41

## 2018-03-31 RX ADMIN — MINERAL OIL AND WHITE PETROLATUM: 150; 830 OINTMENT OPHTHALMIC at 22:03

## 2018-03-31 RX ADMIN — SODIUM CHLORIDE 1 MCG/KG/MIN: 0.9 INJECTION, SOLUTION INTRAVENOUS at 21:59

## 2018-03-31 RX ADMIN — SODIUM CHLORIDE, SODIUM LACTATE, POTASSIUM CHLORIDE, AND CALCIUM CHLORIDE 1000 ML: .6; .31; .03; .02 INJECTION, SOLUTION INTRAVENOUS at 22:05

## 2018-03-31 RX ADMIN — HALOPERIDOL LACTATE 5 MG: 5 INJECTION, SOLUTION INTRAMUSCULAR at 17:29

## 2018-03-31 RX ADMIN — PROPOFOL 50 MCG/KG/MIN: 10 INJECTION, EMULSION INTRAVENOUS at 20:15

## 2018-03-31 RX ADMIN — PROPOFOL 50 MG: 10 INJECTION, EMULSION INTRAVENOUS at 20:38

## 2018-03-31 RX ADMIN — MINERAL OIL AND WHITE PETROLATUM: 150; 830 OINTMENT OPHTHALMIC at 23:53

## 2018-03-31 RX ADMIN — LABETALOL 20 MG/4 ML (5 MG/ML) INTRAVENOUS SYRINGE 20 MG: at 20:37

## 2018-03-31 RX ADMIN — Medication 100 MG: at 18:50

## 2018-03-31 RX ADMIN — LABETALOL HYDROCHLORIDE 20 MG: 5 INJECTION, SOLUTION INTRAVENOUS at 20:37

## 2018-03-31 RX ADMIN — CHLORHEXIDINE GLUCONATE 15 ML: 1.2 RINSE ORAL at 22:07

## 2018-03-31 RX ADMIN — SODIUM CHLORIDE 1000 ML: 0.9 INJECTION, SOLUTION INTRAVENOUS at 18:15

## 2018-03-31 RX ADMIN — SODIUM CHLORIDE 1000 ML: 0.9 INJECTION, SOLUTION INTRAVENOUS at 18:00

## 2018-03-31 RX ADMIN — VECURONIUM BROMIDE 10 MG: 10 INJECTION, POWDER, LYOPHILIZED, FOR SOLUTION INTRAVENOUS at 21:25

## 2018-03-31 RX ADMIN — HEPARIN SODIUM 5000 UNITS: 5000 INJECTION, SOLUTION INTRAVENOUS; SUBCUTANEOUS at 22:07

## 2018-03-31 RX ADMIN — DIAZEPAM 5 MG: 5 INJECTION, SOLUTION INTRAMUSCULAR; INTRAVENOUS at 20:33

## 2018-03-31 RX ADMIN — PROPOFOL 100 MCG/KG/MIN: 10 INJECTION, EMULSION INTRAVENOUS at 23:35

## 2018-03-31 NOTE — ED NOTES
Not able to obtain proper vitals at this time due to pt needing medication  requested medication from provider None ordered at time     [facility]  03/31/18 9872

## 2018-03-31 NOTE — ED PROVIDER NOTES
History  Chief Complaint   Patient presents with    Withdrawal - Drug     pt called EMS after having withdrawl symptoms last use at 3 pm yesterday  Patient is a 53-year-old female who presents to the emergency department by ambulance for possible drug withdrawal   According to EMS, patient started having restlessness and inability to control the movements of her legs starting around 3 p m  today  According to EMS, patient uses heroin and her last heroin use was yesterday  Today she brought Suboxone off the street and starting at around 3 p m  she felt like she cannot control her legs, felt very anxious  Patient currently complaining of trouble breathing, headache as well as palpitations  She is unsure if the Suboxone was laced with anything  She denies ever having these type of symptoms before  Per EMS, patient required a total of 4 milligrams of Ativan for sedation as she was uncooperative and unable to sit still  On arrival to the ED, patient still uncooperative and unable to follow directions  She is constantly moving her legs  She appears altered  She cannot provide much more information at this time other than what was stated above  She denies any recent alcohol use and states she has not drank in over 20 years  Denies other illicit drug use other than heroin  According to records, past medical history includes COPD and pulmonary fibrosis  Airway intact, speaking in full sentences  Appears confused and agitated, restless on stretcher, not following commands  GCS 14  Mildly tachypneic  Lungs CTA b/l  HR regular but tachy  No m/r/g  Bounding pulses  PERRL  Moving all extremities equally  Intact strength against resistance  Neck supple without meningismus (patient writhing/agitated on stretcher with full neck mobility)  Following some commands but distracted/agitated quickly  Poor attention             History provided by:  Patient and EMS personnel  History limited by:  Mental status change   used: No        None       Past Medical History:   Diagnosis Date    Emphysema/COPD ([facility] [address] )     Pulmonary fibrosis ([facility] [address] )        No past surgical history on file  No family history on file  I have reviewed and agree with the history as documented  Social History   Substance Use Topics    Smoking status: Smoker, Current Status Unknown    Smokeless tobacco: Not on file    Alcohol use Yes        Review of Systems   Unable to perform ROS: Mental status change   Respiratory: Positive for shortness of breath  Cardiovascular: Positive for palpitations  Psychiatric/Behavioral: Positive for agitation  The patient is nervous/anxious  + Restlessness  Physical Exam  ED Triage Vitals   Temperature Pulse Respirations Blood Pressure SpO2   03/31/18 1710 03/31/18 1646 03/31/18 1648 03/31/18 1747 03/31/18 1900   97 9 °F (36 6 °C) (!) 154 (!) 25 (!) 182/83 100 %      Temp Source Heart Rate Source Patient Position - Orthostatic VS BP Location FiO2 (%)   03/31/18 1710 03/31/18 1646 03/31/18 2130 03/31/18 2130 --   Oral Monitor Lying Right arm       Pain Score       --                Vitals:    03/31/18 2000 03/31/18 2130 03/31/18 2200 03/31/18 2355   BP:  99/57 111/65 108/62   BP Location:  Right arm Right arm    Pulse: (!) 128 85 78 67   Resp: (!) 44 (!) 23 22    Temp: 98 8 °F (37 1 °C) 98 8 °F (37 1 °C)     TempSrc:  Oral     SpO2: 99% 100% 100%    Weight:  70 4 kg (155 lb 3 3 oz)       Physical Exam   Constitutional: She appears well-developed and well-nourished  She appears distressed  Patient unable to sit still and constantly moving bilateral lower extremities and trying to sit up off the stretcher  She was placed in 4 point soft restraints for her own and staff safety  She appears altered, has poor attention  No odor, specifically ETOH or acetone  HENT:   Head: Normocephalic and atraumatic     Right Ear: External ear normal    Left Ear: External ear normal    Nose: Nose normal    Mouth/Throat: Oropharynx is clear and moist    Eyes: Conjunctivae are normal  Pupils are equal, round, and reactive to light  Unable to sit still/cooperature for EOM testing  No nystagmus noted  Neck: Normal range of motion  Neck supple  JVD present  Thyromegaly present  No signs of meningismus  Patient moving on stretcher with full neck mobility  Cardiovascular: Regular rhythm, normal heart sounds and intact distal pulses  Exam reveals no gallop and no friction rub  No murmur heard  Patient tachycardic with heart rate of 150 bpm    Pulmonary/Chest: Breath sounds normal  No stridor  No respiratory distress  She has no wheezes  She has no rales  She exhibits no tenderness  Patient mildly tachypneic  Abdominal: Soft  Bowel sounds are normal  She exhibits no distension  There is no tenderness  There is no rebound and no guarding  Musculoskeletal: Normal range of motion  She exhibits no edema or tenderness  Lymphadenopathy:     She has no cervical adenopathy  Neurological: She is alert  No cranial nerve deficit  Alert, agitated, poor focus, easily distracted  Oriented to person only  No tremor or seizure like activity  No focal neurologic deficits  Strength intact  Unable to cooperate for further neuro exam    Skin: Skin is warm  No rash noted  She is diaphoretic  No erythema  No pallor  Scattered areas of ecchymosis along bilateral legs  Psychiatric:   Abnormal behavior  Denies suicidal ideation  Nursing note and vitals reviewed        ED Medications  Medications   sterile water injection **AcuDose Override Pull** (not administered)   propofol (DIPRIVAN) 1000 mg in 100 mL infusion (premix) (80 mcg/kg/min × 67 8 kg Intravenous Rate/Dose Change 4/1/18 0000)   cisatracurium (NIMBEX) 200 mg in sodium chloride 0 9 % 500 mL infusion (2 mcg/kg/min × 67 8 kg Intravenous Rate/Dose Change 4/1/18 0007)     And   artificial tear (LUBRIFRESH P M ) ophthalmic ointment ( Both Eyes Given 3/31/18 2353)   propofol (DIPRIVAN) 1000 mg in 100 mL infusion (premix) (0 mcg/kg/min × 67 8 kg Intravenous Hold 3/31/18 2204)   diazepam (VALIUM) injection 5 mg (5 mg Intravenous Given 3/31/18 2033)   heparin (porcine) subcutaneous injection 5,000 Units (5,000 Units Subcutaneous Given 3/31/18 2207)   chlorhexidine (PERIDEX) 0 12 % oral rinse 15 mL (15 mL Swish & Spit Given 3/31/18 2207)   dexmedetomidine (PRECEDEX) 200 mcg in sodium chloride 0 9 % 50 mL infusion (1 4 mcg/kg/hr × 67 8 kg Intravenous Rate/Dose Change 3/31/18 2258)   propranolol (INDERAL) tablet 20 mg (0 mg Oral Hold 3/31/18 2355)    EMS REPLENISHMENT MED ( Does not apply Given to EMS 3/31/18 1645)   haloperidol lactate (HALDOL) injection 5 mg (5 mg Intramuscular Given by Other 3/31/18 1729)   sodium chloride 0 9 % bolus 1,000 mL (0 mL Intravenous Stopped 3/31/18 1915)   OLANZapine (ZyPREXA) IM injection 10 mg (10 mg Intramuscular Given 3/31/18 1756)   LORazepam (ATIVAN) 2 mg/mL injection 1 mg (1 mg Intravenous Given 3/31/18 1755)   ziprasidone (GEODON) IM injection 20 mg (20 mg Intramuscular Given 3/31/18 1841)   sterile water injection **AcuDose Override Pull** (10 mL  Given 3/31/18 1842)   Succinylcholine Chloride 100 mg/5 mL syringe 100 mg (100 mg Intravenous Given by Other 3/31/18 1850)   etomidate (AMIDATE) 2 mg/mL injection 20 mg (20 mg Intravenous Given by Other 3/31/18 1850)   sodium chloride 0 9 % bolus 1,000 mL (0 mL Intravenous Stopped 3/31/18 2000)   propofol (DIPRIVAN) 200 MG/20ML bolus injection 50 mg (50 mg Intravenous Given 3/31/18 2038)   labetalol (NORMODYNE) injection 20 mg (20 mg Intravenous Given 3/31/18 2037)   vecuronium (NORCURON) injection 10 mg (10 mg Intravenous Given 3/31/18 2125)   lactated ringers bolus 1,000 mL (1,000 mL Intravenous New Bag 3/31/18 2205)       Diagnostic Studies  Results Reviewed     Procedure Component Value Units Date/Time    Lactic acid [[id number]] Collected:  04/01/18 0006    Lab Status:  No result Specimen:  Blood from Arm, Left     Lactic acid [[id number]]     Lab Status:  No result Specimen:  Blood     Osmolality [[id number]]  (Normal) Collected:  03/31/18 1742    Lab Status:  Final result Specimen:  Blood Updated:  03/31/18 2349     Osmolality Serum 293 mmol/KG     T4 [[id number]]  (Normal) Collected:  03/31/18 1813    Lab Status:  Final result Specimen:  Blood from Arm, Left Updated:  03/31/18 2335     T4 TOTAL 10 2 ug/dL     T3, free [[id number]]  (Abnormal) Collected:  03/31/18 1813    Lab Status:  Final result Specimen:  Blood from Arm, Left Updated:  03/31/18 2335     T3, Free 7 38 (H) pg/mL     T4, free [[id number]]  (Normal) Collected:  03/31/18 1813    Lab Status:  Final result Specimen:  Blood from Arm, Left Updated:  03/31/18 2335     Free T4 1 38 ng/dL     T4, free [[id number]]  (Abnormal) Collected:  03/31/18 1646    Lab Status:  Final result Specimen:  Blood from Arm, Right Updated:  03/31/18 2327     Free T4 1 50 (H) ng/dL     Hemolysis Smear [[id number]] Collected:  03/31/18 2142    Lab Status:  Final result Specimen:  Line, Venous Updated:  03/31/18 2247     Hemolysis Smear No Schistocytes or Helmet Cells noted    Blood culture #2 [[id number]] Collected:  03/31/18 2200    Lab Status: In process Specimen:  Blood from Hand, Left Updated:  03/31/18 2236    D-dimer, quantitative [[id number]]  (Abnormal) Collected:  03/31/18 2211    Lab Status:  Final result Specimen:  Blood from Line, Venous Updated:  03/31/18 2236     D-Dimer, Quant 1,018 (H) ng/ml (FEU)     APTT [[id number]]  (Normal) Collected:  03/31/18 2211    Lab Status:  Final result Specimen:  Blood from Line, Venous Updated:  03/31/18 2233     PTT 26 seconds     Narrative:          Therapeutic Heparin Range = 60-90 seconds    Protime-INR [[id number]]  (Normal) Collected:  03/31/18 2211    Lab Status:  Final result Specimen:  Blood from Line, Venous Updated:  03/31/18 2233     Protime 14 3 seconds      INR 1 09    Lactic acid, plasma [[id number]]  (Normal) Collected:  03/31/18 2135    Lab Status:  Final result Specimen:  Blood from Line, Venous Updated:  03/31/18 2226     LACTIC ACID 1 3 mmol/L     Narrative:         Result may be elevated if tourniquet was used during collection  Antithrombin III Activity [[id number]] Collected:  03/31/18 2137    Lab Status: In process Specimen:  Blood from Line, Venous Updated:  03/31/18 2217    Plasminogen activity [[id number]] Collected:  03/31/18 2137    Lab Status: In process Specimen:  Blood from Line, Venous Updated:  03/31/18 2217    Fibrin split products [[id number]] Collected:  03/31/18 2140    Lab Status: In process Specimen:  Blood from Line, Venous Updated:  03/31/18 2217    Fibrinogen [[id number]] Collected:  03/31/18 2140    Lab Status: In process Specimen:  Blood from Line, Venous Updated:  03/31/18 2217    Myoglobin, urine [[id number]] Collected:  03/31/18 2213    Lab Status: In process Specimen:  Urine from Urine, Catheter Updated:  03/31/18 2217    Ammonia [[id number]]  (Abnormal) Collected:  03/31/18 2135    Lab Status:  Final result Specimen:  Blood from Line, Venous Updated:  03/31/18 2216     Ammonia <10 (L) umol/L     Blood culture #1 [[id number]] Collected:  03/31/18 2201    Lab Status: In process Specimen:  Blood from Arm, Right Updated:  03/31/18 2204    Platelet count [[id number]]  (Normal) Collected:  03/31/18 2130    Lab Status:  Final result Specimen:  Blood from Line, Venous Updated:  03/31/18 2149     Platelets 150 Thousands/uL      MPV 10 2 fL     Volatile compounds [[id number]] Collected:  03/31/18 2135    Lab Status:   In process Specimen:  Blood from Line, Venous Updated:  03/31/18 2146    Lactic acid [[id number]]     Lab Status:  No result Specimen:  Blood     Lactate dehydrogenase [[id number]]  (Abnormal) Collected:  03/31/18 1646    Lab Status:  Final result Specimen:  Blood from Arm, Right Updated:  03/31/18 2017      (H) U/L     Lithium level [[id number]]     Lab Status:  No result Specimen:  Blood T3, free [id number]     Lab Status:  No result Specimen:  Blood     Cortisol [[id number]]     Lab Status:  No result Specimen:  Blood     CK (with reflex to MB) [[id number]]  (Normal) Collected:  03/31/18 1646    Lab Status:  Final result Specimen:  Blood from Arm, Right Updated:  03/31/18 1941     Total CK 70 U/L     Lactic acid, plasma [[id number]]  (Abnormal) Collected:  03/31/18 1813    Lab Status:  Final result Specimen:  Blood from Arm, Left Updated:  03/31/18 1845     LACTIC ACID 9 0 (HH) mmol/L     Narrative:         Result may be elevated if tourniquet was used during collection  POCT pregnancy, urine [[id number]]  (Normal) Resulted:  03/31/18 1841    Lab Status:  Final result Updated:  03/31/18 1842     EXT PREG TEST UR (Ref: Negative) negative    Ethanol [[id number]]  (Normal) Collected:  03/31/18 1813    Lab Status:  Final result Specimen:  Blood from Arm, Left Updated:  03/31/18 1838     Ethanol Lvl <3 mg/dL     TSH, 3rd generation with T4 reflex [[id number]]  (Abnormal) Collected:  03/31/18 1646    Lab Status:  Final result Specimen:  Blood from Arm, Right Updated:  03/31/18 1835     TSH 3RD GENERATON <0 007 (L) uIU/mL     Narrative:         Patients undergoing fluorescein dye angiography may retain small amounts of fluorescein in the body for 48-72 hours post procedure  Samples containing fluorescein can produce falsely depressed TSH values  If the patient had this procedure,a specimen should be resubmitted post fluorescein clearance            The recommended reference ranges for TSH during pregnancy are as follows:  First trimester 0 1 to 2 5 uIU/mL  Second trimester  0 2 to 3 0 uIU/mL  Third trimester 0 3 to 3 0 uIU/m      Blood gas, venous [[id number]]  (Abnormal) Collected:  03/31/18 1826    Lab Status:  Final result Specimen:  Blood from Arm, Left Updated:  03/31/18 1832     pH, Jai 7 448 (H)     pCO2, Jai 20 0 (L) mm Hg      pO2, Jai 114 9 (H) mm Hg      HCO3, Jai 13 5 (L) mmol/L      Base Excess, Jai -8 3 mmol/L      O2 Content, Jai 16 6 ml/dL      O2 HGB, VENOUS 94 4 (H) %     Rapid drug screen, urine [[id number]]  (Abnormal) Collected:  03/31/18 1756    Lab Status:  Final result Specimen:  Urine from Urine, Other Updated:  03/31/18 1813     Amph/Meth UR Negative     Barbiturate Ur Negative     Benzodiazepine Urine Negative     Cocaine Urine Negative     Methadone Urine Negative     Opiate Urine Positive (A)     PCP Ur Negative     THC Urine Negative    Narrative:         Presumptive report  If requested, specimen will be sent to reference lab for confirmation  FOR MEDICAL PURPOSES ONLY  IF CONFIRMATION NEEDED PLEASE CONTACT THE LAB WITHIN 5 DAYS  Drug Screen Cutoff Levels:  AMPHETAMINE/METHAMPHETAMINES  1000 ng/mL  BARBITURATES     200 ng/mL  BENZODIAZEPINES     200 ng/mL  COCAINE      300 ng/mL  METHADONE      300 ng/mL  OPIATES      300 ng/mL  PHENCYCLIDINE     25 ng/mL  THC       50 ng/mL    Comprehensive metabolic panel [[id number]]  (Abnormal) Collected:  03/31/18 1646    Lab Status:  Final result Specimen:  Blood from Arm, Right Updated:  03/31/18 1807     Sodium 140 mmol/L      Potassium 3 9 mmol/L      Chloride 101 mmol/L      CO2 20 (L) mmol/L      Anion Gap 19 (H) mmol/L      BUN 9 mg/dL      Creatinine 0 88 mg/dL      Glucose 158 (H) mg/dL      Calcium 8 8 mg/dL      AST 20 U/L      ALT 27 U/L      Alkaline Phosphatase 76 U/L      Total Protein 6 9 g/dL      Albumin 3 2 (L) g/dL      Total Bilirubin 0 20 mg/dL      eGFR 77 ml/min/1 73sq m     Narrative:         National Kidney Disease Education Program recommendations are as follows:  GFR calculation is accurate only with a steady state creatinine  Chronic Kidney disease less than 60 ml/min/1 73 sq  meters  Kidney failure less than 15 ml/min/1 73 sq  meters      Magnesium [[id number]]  (Normal) Collected:  03/31/18 1646    Lab Status:  Final result Specimen:  Blood from Arm, Right Updated:  03/31/18 1807     Magnesium 1 6 mg/dL     UA w Reflex to Microscopic [[id number]]  (Normal) Collected:  03/31/18 1756    Lab Status:  Final result Specimen:  Urine from Urine, Other Updated:  03/31/18 1807     Color, UA Yellow     Clarity, UA Clear     Specific Gravity, UA 1 010     pH, UA 5 5     Leukocytes, UA Negative     Nitrite, UA Negative     Protein, UA Negative mg/dl      Glucose, UA Negative mg/dl      Ketones, UA Negative mg/dl      Urobilinogen, UA 0 2 E U /dl      Bilirubin, UA Negative     Blood, UA Negative    Joes draw [[id number]] Collected:  03/31/18 1646    Lab Status: In process Specimen:  Blood Updated:  03/31/18 1801    Narrative: The following orders were created for panel order Joes draw  Procedure                               Abnormality         Status                     ---------                               -----------         ------                     Vickye Commander Top on NQSR[[id number]]                            Final result               Gold top on SLQP[[id number]]                                  In process                 Green / Yellow tube on NAHQ[[id number]]                       Final result               Green / Black tube on NWFT[[id number]]                        Final result               Lavender Top 3 ml on ZCIE[[id number]]                         Final result                 Please view results for these tests on the individual orders  [facility] [[id number]]  (Normal) Collected:  03/31/18 1646    Lab Status:  Final result Specimen:  Blood from Arm, Right Updated:  03/31/18 1757     Protime 12 8 seconds      INR 0 95    APTT [[id number]]  (Normal) Collected:  03/31/18 1646    Lab Status:  Final result Specimen:  Blood from Arm, Right Updated:  03/31/18 1757     PTT 27 seconds     Narrative:          Therapeutic Heparin Range = 96-56 seconds    Salicylate level [[id number]]  (Abnormal) Collected:  03/31/18 1742    Lab Status:  Final result Specimen:  Blood Updated:  62/11/06 4180     Salicylate Lvl <3 (L) mg/dL     Acetaminophen level [[id number]]  (Abnormal) Collected:  03/31/18 1742    Lab Status:  Final result Specimen:  Blood Updated:  03/31/18 1757     Acetaminophen Level <2 0 (L) ug/mL     Troponin I [[id number]]  (Normal) Collected:  03/31/18 1646    Lab Status:  Final result Specimen:  Blood from Arm, Right Updated:  03/31/18 1755     Troponin I <0 02 ng/mL     Narrative:         [employer] Chemistry analyzer 99% cutoff is > 0 04 ng/mL in network labs    o cTnI 99% cutoff is useful only when applied to patients in the clinical setting of myocardial ischemia  o cTnI 99% cutoff should be interpreted in the context of clinical history, ECG findings and possibly cardiac imaging to establish correct diagnosis  o cTnI 99% cutoff may be suggestive but clearly not indicative of a coronary event without the clinical setting of myocardial ischemia  CBC and differential [[id number]]  (Abnormal) Collected:  03/31/18 1646    Lab Status:  Final result Specimen:  Blood from Arm, Right Updated:  03/31/18 1743     WBC 9 05 Thousand/uL      RBC 4 47 Million/uL      Hemoglobin 12 3 g/dL      Hematocrit 40 1 %      MCV 90 fL      MCH 27 5 pg      MCHC 30 7 (L) g/dL      RDW 16 2 (H) %      MPV 10 5 fL      Platelets 077 Thousands/uL      nRBC 0 /100 WBCs      Neutrophils Relative 87 (H) %      Lymphocytes Relative 10 (L) %      Monocytes Relative 2 (L) %      Eosinophils Relative 0 %      Basophils Relative 0 %      Neutrophils Absolute 7 89 (H) Thousands/µL      Lymphocytes Absolute 0 86 Thousands/µL      Monocytes Absolute 0 21 Thousand/µL      Eosinophils Absolute 0 03 Thousand/µL      Basophils Absolute 0 02 Thousands/µL                  CT chest abdomen pelvis wo contrast   Final Result by Kishan Blanc MD (03/31 2057)   1  Limited exam due to motion  Bibasilar, left lingular infiltrate/atelectasis  2   No acute intra-abdominal pathology                       Workstation performed: [id number]         CT spine cervical without contrast   Final Result by Christiana Reeves MD (03/31 2047)      No cervical spine fracture or traumatic malalignment  Workstation performed: [id number]         CT head without contrast   Final Result by Christiana Reeves MD (03/31 2043)      No acute intracranial abnormality  Workstation performed: [id number]         XR chest 1 view portable   ED Interpretation by Austin Gil DO (03/31 1938)   ETT in good position just below clavicles  OG tube in place  No pneumothorax  XR chest 1 view portable   ED Interpretation by Austin Gil DO (03/31 1802)   Poor film due to patient head position  No obvious abnormality        US thyroid    (Results Pending)   XR chest pa only    (Results Pending)              Procedures  ECG 12 Lead Documentation  Date/Time: 3/31/2018 5:43 PM  Performed by: Daniel Young by: Harry Walters     ECG reviewed by me, the ED Provider: yes    Patient location:  ED  Previous ECG:     Previous ECG:  Unavailable  Rate:     ECG rate:  155    ECG rate assessment: tachycardic    Rhythm:     Rhythm: sinus tachycardia      Rhythm comment:  Short MI interval  Ectopy:     Ectopy: none    QRS:     QRS axis:  Normal    QRS intervals:  Normal  ST segments:     ST segments:  Non-specific  T waves:     T waves: non-specific    ECG 12 Lead Documentation  Date/Time: 3/31/2018 5:55 PM  Performed by: Daniel Young by: Harry Walters     ECG reviewed by me, the ED Provider: yes    Patient location:  ED  Previous ECG:     Previous ECG:  Compared to current    Similarity:  No change  Rate:     ECG rate:  136    ECG rate assessment: tachycardic    Rhythm:     Rhythm: sinus tachycardia      Rhythm comment:  Short MI  Ectopy:     Ectopy: none    QRS:     QRS axis:  Normal    QRS intervals:  Normal  Conduction:     Conduction: normal    ST segments:     ST segments:  Non-specific  T waves:     T waves: non-specific    Intubation  Date/Time: 3/31/2018 7:00 PM  Performed by: Sylvie Matute  Authorized by: Sylvie Matute     Patient location:  ED  Other Assisting Provider: No    Consent:     Consent obtained:  Emergent situation  Universal protocol:     Patient states understanding of procedure being performed: Patient altered and intubation was emergent  Immediately prior to procedure, a time out was called: yes      Patient identity confirmed:  Arm band and hospital-assigned identification number  Pre-procedure details:     Patient status:  Altered mental status    Pretreatment medications:  Etomidate    Paralytics:  Succinylcholine  Indications:     Indications for intubation: other (comment)      Indications for intubation comment:  Severe AMS and agitation preventing necessary medical workup, as well as for airway protection as patient's MS was declining rapidly  Procedure details:     Preoxygenation:  Bag valve mask    CPR in progress: no      Intubation method:  Oral    Oral intubation technique:  Direct    Laryngoscope blade: Mac 3    Tube size (mm):  8 0    Tube type:  Cuffed    Number of attempts:  1    Ventilation between attempts: no      Cricoid pressure: no      Tube visualized through cords: yes    Placement assessment:     ETT to lip:  23    Tube secured with:  ETT arredondo    Breath sounds:  Equal and absent over the epigastrium    Placement verification: chest rise, condensation, CXR verification, direct visualization, equal breath sounds and capnography      CXR findings:  ETT in proper place  Post-procedure details:     Patient tolerance of procedure:   Tolerated well, no immediate complications  CriticalCare Time  Performed by: Sylvie Matute  Authorized by: Sylvie Matute     Critical care provider statement:     Critical care time (minutes):  70    Critical care time was exclusive of:  Separately billable procedures and treating other patients    Critical care was necessary to treat or prevent imminent or life-threatening deterioration of the following conditions:  CNS failure or compromise, toxidrome, metabolic crisis, endocrine crisis and dehydration    Critical care was time spent personally by me on the following activities:  Blood draw for specimens, obtaining history from patient or surrogate, discussions with consultants, evaluation of patient's response to treatment, examination of patient, ventilator management, review of old charts, re-evaluation of patient's condition, ordering and review of radiographic studies, ordering and review of laboratory studies, ordering and performing treatments and interventions and development of treatment plan with patient or surrogate    I assumed direction of critical care for this patient from another provider in my specialty: no             Phone Contacts  ED Phone Contact    ED Course  ED Course as of Apr 01 0010   Sat Mar 31, 2018   1802 Troponin I: <0 02   1824 Low suspicion of DKA given her glucose is 158 she has no history of diabetes  Will check serum osm and toxic alcohol levels  Anion Gap: (!) 19   1835 HCO3, Jai: (!) 13 5   1835 pCO2, Jai: (!) 20 0   1841 Spoke with critical care attending, Dr Christine Bruce about patient  Concerned about possible thyroid storm vs  Serotonin syndrome  Based on VBG, she does have metabolic acidosis with respiratory compensation  Critical care attending recommended starting precedex drip at 1 5 micrograms/kilogram per hour for further sedation  He recommended holding off on beta blockers  ICU PA to come down and evaluate patient  1847 LACTIC ACID: (!!) 9 0   1913 Patient increasingly agitated and noncooperative  It was decided that in order to complete workup for altered mental status, patient required intubation given her current condition and behavior and inability to sit still and cooperate  Please see separate ED procedure note for intubation details  Sylvester critical care advanced practitioner at bedside at time of intubation and currently    Labetalol 20 milligrams IV ordered for possible thyrotoxicosis  Patient given 50 milligram propofol bolus and started on propofol drip after intubation  She was biting and fighting the tube so additional 10 milligrams of vecuronium was given  36 Spoke with patient's  in the family meeting area to update him of events in the ED thus far including intubation   was unaware that she relapsed on heroin  He states she takes a bunch of medications and he is not sure what they are  He plans to go home to attend to his children and then return and I advised him to come back with all of her medications  1954 Patient's IV blew  Multiple attempts at establishing new IV access were made  An EJ was established  Will obtain CT scans without contrast for now  MDM  Number of Diagnoses or Management Options  Abnormal TSH:   Acute encephalopathy:   Metabolic acidosis:   Restlessness:   Sinus tachycardia:   Diagnosis management comments: 66-year-old female with history of drug abuse presents with acute altered mental status, restlessness and restless legs  Differential includes acute drug or alcohol intoxication, drug withdrawal, trauma/ICH, encephalopathy from sepsis/infection, primary cardiac abnormality such as dysrhythmia, metabolic abnormality such as hyper/-hypoglycemia, acidosis, hyperammonemia, , endocrinopathy such as thyrotoxicosis, acute coronary syndrome, PE,  Low suspicion for acute CVA given nonfocal exam  NO significant signs of trauma other than bruising which are likely track marks or self-induced  Low suspicion for meningitis/encephalitis given afebrile with VS abnormalities, nonfocal exam, no significant HA, photophobia or meningismus  No indication for LP at this time and patient too uncooperative/agitated  Will do full workup with cardiac/septic labs, electrolytes, coma panel, UDS, thyroid studies, UA  Will obtain CT scan of the head and portable chest x-ray  Will give 5 milligrams of IM Haldol as she already received 4 milligrams of Ativan without much relief of her symptoms  Will continue with restraints until she can become more cooperative  Amount and/or Complexity of Data Reviewed  Clinical lab tests: ordered and reviewed  Tests in the radiology section of CPT®: ordered and reviewed  Tests in the medicine section of CPT®: ordered and reviewed  Discussion of test results with the performing providers: yes  Decide to obtain previous medical records or to obtain history from someone other than the patient: yes  Obtain history from someone other than the patient: yes  Review and summarize past medical records: yes  Discuss the patient with other providers: yes  Independent visualization of images, tracings, or specimens: yes      CritCare Time    Disposition  Final diagnoses:   Metabolic acidosis   Acute encephalopathy   Sinus tachycardia   Restlessness   Abnormal TSH     Time reflects when diagnosis was documented in both MDM as applicable and the Disposition within this note     Time User Action Codes Description Comment    3/31/2018  7:02 PM Mary Hernandez Add [R94 6] Low serum thyroid stimulating hormone (TSH)     3/31/2018  7:55 PM Pablobernadette Bolden E Add [A15 3] Metabolic acidosis     7/50/6790  7:55 PM Pablo Bolden E Add [G93 40] Acute encephalopathy     3/31/2018  7:55 PM Pablo Bolden E Add [R00 0] Sinus tachycardia     3/31/2018  7:55 PM Pablo Bolden E Add [R45 1] Restlessness     3/31/2018  7:55 PM Pablo Bolden E Add [R94 6] Abnormal TSH       ED Disposition     ED Disposition Condition Comment    Admit  Case was discussed with Critical care attending and the patient's admission status was agreed to be Admission Status: inpatient status to the service of Dr Sarthak Hayes   Follow-up Information    None       There are no discharge medications for this patient        Outpatient Discharge Orders  T3, free   Standing Status: Future Number of Occurrences: 1 Standing Exp  Date: 03/31/19     T4   Standing Status: Future Number of Occurrences: 1 Standing Exp  Date: 03/31/19     Anti-thyroglobulin antibody   Standing Status: Future  Standing Exp  Date: 03/31/19     Thyroglobulin   Standing Status: Future  Standing Exp   Date: 03/31/19         ED Provider  Electronically Signed by           Adelfo Rhoades DO  04/01/18 0010

## 2018-04-01 ENCOUNTER — APPOINTMENT (INPATIENT)
Dept: RADIOLOGY | Facility: HOSPITAL | Age: 48
DRG: 896 | End: 2018-04-01
Payer: MEDICARE

## 2018-04-01 PROBLEM — F11.23 OPIATE WITHDRAWAL (HCC): Status: ACTIVE | Noted: 2018-04-01

## 2018-04-01 PROBLEM — I16.9 HYPERTENSIVE CRISIS: Status: ACTIVE | Noted: 2018-04-01

## 2018-04-01 PROBLEM — F11.23 OPIOID DEPENDENCE WITH WITHDRAWAL (HCC): Status: ACTIVE | Noted: 2018-04-01

## 2018-04-01 PROBLEM — G25.79 SEROTONIN SYNDROME: Status: ACTIVE | Noted: 2018-04-01

## 2018-04-01 PROBLEM — J43.9 EMPHYSEMA/COPD (HCC): Status: ACTIVE | Noted: 2018-04-01

## 2018-04-01 PROBLEM — J96.01 ACUTE HYPOXEMIC RESPIRATORY FAILURE (HCC): Status: ACTIVE | Noted: 2018-04-01

## 2018-04-01 PROBLEM — R41.82 ALTERED MENTAL STATUS: Status: ACTIVE | Noted: 2018-04-01

## 2018-04-01 PROBLEM — I27.20 PULMONARY HTN (HCC): Status: ACTIVE | Noted: 2018-04-01

## 2018-04-01 PROBLEM — E05.91 THYROTOXICOSIS WITH THYROTOXIC CRISIS: Status: ACTIVE | Noted: 2018-04-01

## 2018-04-01 PROBLEM — J84.9 ILD (INTERSTITIAL LUNG DISEASE) (HCC): Status: ACTIVE | Noted: 2018-04-01

## 2018-04-01 PROBLEM — E27.40 ADRENAL INSUFFICIENCY (HCC): Status: ACTIVE | Noted: 2018-04-01

## 2018-04-01 LAB
ALBUMIN SERPL BCP-MCNC: 2.5 G/DL (ref 3.5–5)
ALP SERPL-CCNC: 57 U/L (ref 46–116)
ALT SERPL W P-5'-P-CCNC: 24 U/L (ref 12–78)
ANION GAP SERPL CALCULATED.3IONS-SCNC: 9 MMOL/L (ref 4–13)
APTT PPP: 26 SECONDS (ref 23–35)
AST SERPL W P-5'-P-CCNC: 22 U/L (ref 5–45)
ATRIAL RATE: 57 BPM
BASOPHILS # BLD AUTO: 0.01 THOUSANDS/ΜL (ref 0–0.1)
BASOPHILS NFR BLD AUTO: 0 % (ref 0–1)
BILIRUB SERPL-MCNC: 0.2 MG/DL (ref 0.2–1)
BUN SERPL-MCNC: 10 MG/DL (ref 5–25)
CALCIUM SERPL-MCNC: 7.9 MG/DL (ref 8.3–10.1)
CHLORIDE SERPL-SCNC: 106 MMOL/L (ref 100–108)
CO2 SERPL-SCNC: 24 MMOL/L (ref 21–32)
CORTIS SERPL-MCNC: 2.2 UG/DL
CREAT SERPL-MCNC: 0.45 MG/DL (ref 0.6–1.3)
DEPRECATED AT III PPP: 96 % OF NORMAL (ref 92–136)
EOSINOPHIL # BLD AUTO: 0.02 THOUSAND/ΜL (ref 0–0.61)
EOSINOPHIL NFR BLD AUTO: 0 % (ref 0–6)
ERYTHROCYTE [DISTWIDTH] IN BLOOD BY AUTOMATED COUNT: 16.2 % (ref 11.6–15.1)
FDP BLD QL AGGL: >10 <20
FIBRINOGEN PPP-MCNC: 368 MG/DL (ref 227–495)
GFR SERPL CREATININE-BSD FRML MDRD: 118 ML/MIN/1.73SQ M
GLUCOSE SERPL-MCNC: 108 MG/DL (ref 65–140)
GLUCOSE SERPL-MCNC: 116 MG/DL (ref 65–140)
HCT VFR BLD AUTO: 33.3 % (ref 34.8–46.1)
HGB BLD-MCNC: 10.2 G/DL (ref 11.5–15.4)
HIV 1+2 AB+HIV1 P24 AG SERPL QL IA: NORMAL
HIV1 P24 AG SER QL: NORMAL
INR PPP: 0.99 (ref 0.86–1.16)
LACTATE SERPL-SCNC: 0.7 MMOL/L (ref 0.5–2)
LACTATE SERPL-SCNC: 0.7 MMOL/L (ref 0.5–2)
LACTATE SERPL-SCNC: 0.8 MMOL/L (ref 0.5–2)
LYMPHOCYTES # BLD AUTO: 0.62 THOUSANDS/ΜL (ref 0.6–4.47)
LYMPHOCYTES NFR BLD AUTO: 10 % (ref 14–44)
MAGNESIUM SERPL-MCNC: 2.1 MG/DL (ref 1.6–2.6)
MCH RBC QN AUTO: 27.4 PG (ref 26.8–34.3)
MCHC RBC AUTO-ENTMCNC: 30.6 G/DL (ref 31.4–37.4)
MCV RBC AUTO: 90 FL (ref 82–98)
MONOCYTES # BLD AUTO: 0.38 THOUSAND/ΜL (ref 0.17–1.22)
MONOCYTES NFR BLD AUTO: 6 % (ref 4–12)
NEUTROPHILS # BLD AUTO: 5.01 THOUSANDS/ΜL (ref 1.85–7.62)
NEUTS SEG NFR BLD AUTO: 83 % (ref 43–75)
NRBC BLD AUTO-RTO: 0 /100 WBCS
P AXIS: 44 DEGREES
PHOSPHATE SERPL-MCNC: 2.6 MG/DL (ref 2.7–4.5)
PLATELET # BLD AUTO: 240 THOUSANDS/UL (ref 149–390)
PMV BLD AUTO: 10.2 FL (ref 8.9–12.7)
POTASSIUM SERPL-SCNC: 4.3 MMOL/L (ref 3.5–5.3)
PR INTERVAL: 106 MS
PROT SERPL-MCNC: 5.5 G/DL (ref 6.4–8.2)
PROTHROMBIN TIME: 13.3 SECONDS (ref 12.1–14.4)
QRS AXIS: 43 DEGREES
QRSD INTERVAL: 84 MS
QT INTERVAL: 452 MS
QTC INTERVAL: 439 MS
RBC # BLD AUTO: 3.72 MILLION/UL (ref 3.81–5.12)
SODIUM SERPL-SCNC: 139 MMOL/L (ref 136–145)
T WAVE AXIS: 52 DEGREES
TPA PPP QL CHRO: 87 % OF NORMAL (ref 77–138)
VENTRICULAR RATE: 57 BPM
WBC # BLD AUTO: 6.07 THOUSAND/UL (ref 4.31–10.16)

## 2018-04-01 PROCEDURE — 82948 REAGENT STRIP/BLOOD GLUCOSE: CPT

## 2018-04-01 PROCEDURE — 83605 ASSAY OF LACTIC ACID: CPT | Performed by: PHYSICIAN ASSISTANT

## 2018-04-01 PROCEDURE — 36600 WITHDRAWAL OF ARTERIAL BLOOD: CPT

## 2018-04-01 PROCEDURE — 86704 HEP B CORE ANTIBODY TOTAL: CPT | Performed by: NURSE PRACTITIONER

## 2018-04-01 PROCEDURE — 85730 THROMBOPLASTIN TIME PARTIAL: CPT | Performed by: PHYSICIAN ASSISTANT

## 2018-04-01 PROCEDURE — 93010 ELECTROCARDIOGRAM REPORT: CPT | Performed by: INTERNAL MEDICINE

## 2018-04-01 PROCEDURE — 94760 N-INVAS EAR/PLS OXIMETRY 1: CPT

## 2018-04-01 PROCEDURE — 85025 COMPLETE CBC W/AUTO DIFF WBC: CPT | Performed by: PHYSICIAN ASSISTANT

## 2018-04-01 PROCEDURE — 87340 HEPATITIS B SURFACE AG IA: CPT | Performed by: NURSE PRACTITIONER

## 2018-04-01 PROCEDURE — 93005 ELECTROCARDIOGRAM TRACING: CPT

## 2018-04-01 PROCEDURE — 85610 PROTHROMBIN TIME: CPT | Performed by: PHYSICIAN ASSISTANT

## 2018-04-01 PROCEDURE — 86803 HEPATITIS C AB TEST: CPT | Performed by: NURSE PRACTITIONER

## 2018-04-01 PROCEDURE — 87806 HIV AG W/HIV1&2 ANTB W/OPTIC: CPT | Performed by: NURSE PRACTITIONER

## 2018-04-01 PROCEDURE — 83735 ASSAY OF MAGNESIUM: CPT | Performed by: PHYSICIAN ASSISTANT

## 2018-04-01 PROCEDURE — 94003 VENT MGMT INPAT SUBQ DAY: CPT

## 2018-04-01 PROCEDURE — 84100 ASSAY OF PHOSPHORUS: CPT | Performed by: PHYSICIAN ASSISTANT

## 2018-04-01 PROCEDURE — 86705 HEP B CORE ANTIBODY IGM: CPT | Performed by: NURSE PRACTITIONER

## 2018-04-01 PROCEDURE — 80053 COMPREHEN METABOLIC PANEL: CPT | Performed by: PHYSICIAN ASSISTANT

## 2018-04-01 PROCEDURE — 71045 X-RAY EXAM CHEST 1 VIEW: CPT

## 2018-04-01 RX ORDER — MYCOPHENOLATE MOFETIL 250 MG/1
1000 CAPSULE ORAL EVERY 12 HOURS SCHEDULED
COMMUNITY

## 2018-04-01 RX ORDER — PROPRANOLOL HYDROCHLORIDE 20 MG/1
40 TABLET ORAL EVERY 8 HOURS SCHEDULED
Status: DISCONTINUED | OUTPATIENT
Start: 2018-04-01 | End: 2018-04-01

## 2018-04-01 RX ORDER — BUPROPION HYDROCHLORIDE 150 MG/1
150 TABLET, EXTENDED RELEASE ORAL DAILY
COMMUNITY

## 2018-04-01 RX ORDER — CLONIDINE HYDROCHLORIDE 0.1 MG/1
0.2 TABLET ORAL EVERY 12 HOURS SCHEDULED
Status: DISCONTINUED | OUTPATIENT
Start: 2018-04-01 | End: 2018-04-03 | Stop reason: HOSPADM

## 2018-04-01 RX ORDER — VENLAFAXINE HYDROCHLORIDE 75 MG/1
75 CAPSULE, EXTENDED RELEASE ORAL DAILY
COMMUNITY

## 2018-04-01 RX ORDER — DEXTROAMPHETAMINE SACCHARATE, AMPHETAMINE ASPARTATE, DEXTROAMPHETAMINE SULFATE AND AMPHETAMINE SULFATE 5; 5; 5; 5 MG/1; MG/1; MG/1; MG/1
10 TABLET ORAL
COMMUNITY

## 2018-04-01 RX ORDER — CLONIDINE HYDROCHLORIDE 0.1 MG/1
0.2 TABLET ORAL EVERY 12 HOURS SCHEDULED
Status: DISCONTINUED | OUTPATIENT
Start: 2018-04-01 | End: 2018-04-01

## 2018-04-01 RX ORDER — FLUCONAZOLE 200 MG/1
200 TABLET ORAL 2 TIMES DAILY
COMMUNITY

## 2018-04-01 RX ORDER — METFORMIN HYDROCHLORIDE 500 MG/1
TABLET, EXTENDED RELEASE ORAL 2 TIMES DAILY WITH MEALS
Status: ON HOLD | COMMUNITY
End: 2018-04-01

## 2018-04-01 RX ORDER — ACETAMINOPHEN 325 MG/1
650 TABLET ORAL EVERY 6 HOURS PRN
Status: DISCONTINUED | OUTPATIENT
Start: 2018-04-01 | End: 2018-04-03 | Stop reason: HOSPADM

## 2018-04-01 RX ORDER — LEVOTHYROXINE AND LIOTHYRONINE 76; 18 UG/1; UG/1
240 TABLET ORAL DAILY
Status: ON HOLD | COMMUNITY
End: 2018-04-03

## 2018-04-01 RX ORDER — POTASSIUM CHLORIDE 20 MEQ/1
20 TABLET, EXTENDED RELEASE ORAL DAILY
COMMUNITY

## 2018-04-01 RX ORDER — SULFAMETHOXAZOLE AND TRIMETHOPRIM 800; 160 MG/1; MG/1
1 TABLET ORAL EVERY 12 HOURS SCHEDULED
COMMUNITY

## 2018-04-01 RX ORDER — LIOTHYRONINE SODIUM 5 UG/1
5 TABLET ORAL 2 TIMES DAILY
COMMUNITY
End: 2018-04-03 | Stop reason: HOSPADM

## 2018-04-01 RX ORDER — PREDNISONE 10 MG/1
10 TABLET ORAL DAILY
COMMUNITY

## 2018-04-01 RX ORDER — FUROSEMIDE 20 MG/1
40 TABLET ORAL DAILY
COMMUNITY

## 2018-04-01 RX ADMIN — PROPOFOL 50 MCG/KG/MIN: 10 INJECTION, EMULSION INTRAVENOUS at 11:05

## 2018-04-01 RX ADMIN — MINERAL OIL AND WHITE PETROLATUM: 150; 830 OINTMENT OPHTHALMIC at 03:37

## 2018-04-01 RX ADMIN — MINERAL OIL AND WHITE PETROLATUM 1 APPLICATION: 150; 830 OINTMENT OPHTHALMIC at 01:51

## 2018-04-01 RX ADMIN — HYDROCORTISONE SODIUM SUCCINATE 100 MG: 100 INJECTION, POWDER, FOR SOLUTION INTRAMUSCULAR; INTRAVENOUS at 01:50

## 2018-04-01 RX ADMIN — DEXMEDETOMIDINE HYDROCHLORIDE 0.3 MCG/KG/HR: 100 INJECTION, SOLUTION INTRAVENOUS at 13:41

## 2018-04-01 RX ADMIN — ACETAMINOPHEN 650 MG: 325 TABLET, FILM COATED ORAL at 21:11

## 2018-04-01 RX ADMIN — DEXMEDETOMIDINE HYDROCHLORIDE 1.4 MCG/KG/HR: 100 INJECTION, SOLUTION INTRAVENOUS at 00:14

## 2018-04-01 RX ADMIN — HEPARIN SODIUM 5000 UNITS: 5000 INJECTION, SOLUTION INTRAVENOUS; SUBCUTANEOUS at 15:42

## 2018-04-01 RX ADMIN — PROPOFOL 80 MCG/KG/MIN: 10 INJECTION, EMULSION INTRAVENOUS at 05:11

## 2018-04-01 RX ADMIN — DEXMEDETOMIDINE HYDROCHLORIDE 1.4 MCG/KG/HR: 100 INJECTION, SOLUTION INTRAVENOUS at 01:54

## 2018-04-01 RX ADMIN — HEPARIN SODIUM 5000 UNITS: 5000 INJECTION, SOLUTION INTRAVENOUS; SUBCUTANEOUS at 05:11

## 2018-04-01 RX ADMIN — CHLORHEXIDINE GLUCONATE 15 ML: 1.2 RINSE ORAL at 09:19

## 2018-04-01 RX ADMIN — HYDROCORTISONE SODIUM SUCCINATE 100 MG: 100 INJECTION, POWDER, FOR SOLUTION INTRAMUSCULAR; INTRAVENOUS at 09:19

## 2018-04-01 RX ADMIN — HEPARIN SODIUM 5000 UNITS: 5000 INJECTION, SOLUTION INTRAVENOUS; SUBCUTANEOUS at 21:11

## 2018-04-01 RX ADMIN — DEXMEDETOMIDINE HYDROCHLORIDE 1.4 MCG/KG/HR: 100 INJECTION, SOLUTION INTRAVENOUS at 09:15

## 2018-04-01 RX ADMIN — PREDNISONE 50 MG: 20 TABLET ORAL at 15:42

## 2018-04-01 RX ADMIN — MINERAL OIL AND WHITE PETROLATUM: 150; 830 OINTMENT OPHTHALMIC at 05:11

## 2018-04-01 RX ADMIN — PROPOFOL 80 MCG/KG/MIN: 10 INJECTION, EMULSION INTRAVENOUS at 01:50

## 2018-04-01 RX ADMIN — DIAZEPAM 5 MG: 5 INJECTION, SOLUTION INTRAMUSCULAR; INTRAVENOUS at 09:19

## 2018-04-01 RX ADMIN — DIAZEPAM 5 MG: 5 INJECTION, SOLUTION INTRAMUSCULAR; INTRAVENOUS at 11:08

## 2018-04-01 RX ADMIN — PROPRANOLOL HYDROCHLORIDE 40 MG: 20 TABLET ORAL at 05:17

## 2018-04-01 RX ADMIN — CLONIDINE HYDROCHLORIDE 0.2 MG: 0.1 TABLET ORAL at 15:42

## 2018-04-01 RX ADMIN — PROPOFOL 50 MCG/KG/MIN: 10 INJECTION, EMULSION INTRAVENOUS at 09:19

## 2018-04-01 RX ADMIN — DEXMEDETOMIDINE HYDROCHLORIDE 1.4 MCG/KG/HR: 100 INJECTION, SOLUTION INTRAVENOUS at 06:32

## 2018-04-01 NOTE — CASE MANAGEMENT
Initial Clinical Review    Admission: Date/Time/Statement: 3/31/18 @ 2010     Orders Placed This Encounter   Procedures    Inpatient Admission     Standing Status:   Standing     Number of Occurrences:   1     Order Specific Question:   Admitting Physician     Answer:   Renate Mendoza     Order Specific Question:   Level of Care     Answer:   Critical Care [15]     Order Specific Question:   Estimated length of stay     Answer:   More than 2 Midnights     Order Specific Question:   Certification     Answer:   I certify that inpatient services are medically necessary for this patient for a duration of greater than two midnights  See H&P and MD Progress Notes for additional information about the patient's course of treatment  ED: Date/Time/Mode of Arrival:   ED Arrival Information     Expected Arrival Acuity Means of Arrival Escorted By Service Admission Type    - 3/31/2018 16:36 Emergent Ambulance [facility]) Critical Care/ICU Emergency    Arrival Complaint    withdawal        Chief Complaint:   Chief Complaint   Patient presents with    Withdrawal - Drug     pt called EMS after having withdrawl symptoms last use at 3 pm yesterday  History of Illness: Shruthi Jesus is a 50 y o  female with a past Hx of illicit IV drug abuse, admitting to heroin use and another agent that she did not know the name of, who presented to the ED c/o trouble breathing, headache, and palpitations  She had stated that she bought Suboxone (questionable content) off of the street and began using it at ~1500 hrs, since that time cannot control her legs, affect very anxious, her last heroin use was yesterday  While in the ED lab studies and other diagnostics were obtained with critical results to follow: Tox positive for opiates, , lactic acid 9 0,  TSH <0 007  A CT Head, neck, chest, abdomen, and pelvis were obtained which had unremarkable results    While in the ED this patient became non-controlable of actions and could not be redirected requiring high doses of multiple sedative agents, she was intubated for airway protect and respiratory control  CCM was contacted to assume patient care  Upon exam, this patient was received in ED 13 intubated and on mechanical ventilation, on propofol 50 mcg,  mmHG, Labetolol 20 mg ordered, patient noted to not being well sedated on this dosing, increased to 100 mcg, patient noted to be chewing on endotracheal tube despite this increase, patient given 10 mg vecuronium, Nimbex drip ordered  Patient transferred to the ICU for continuation of care  After arriving in the ICU a BIZ monitor and Train of four baseline obtained,patient was started on Precedex and on the Nimbex drip       ED Vital Signs:   ED Triage Vitals   Temperature Pulse Respirations Blood Pressure SpO2   03/31/18 1710 03/31/18 1646 03/31/18 1648 03/31/18 1747 03/31/18 1900   97 9 °F (36 6 °C) (!) 154 (!) 25 (!) 182/83 100 %      Temp Source Heart Rate Source Patient Position - Orthostatic VS BP Location FiO2 (%)   03/31/18 1710 03/31/18 1646 03/31/18 2130 03/31/18 2130 04/01/18 0900   Oral Monitor Lying Right arm 35      Pain Score       --               Wt Readings from Last 1 Encounters:   04/01/18 70 4 kg (155 lb 3 3 oz)     Vital Signs (abnormal):   04/01/18 1300  99 5 °F (37 5 °C)  57   28  113/61  82  97 %  --  --   04/01/18 1233  --  --  --  --  --  --  Nasal cannula  --   04/01/18 1230  99 5 °F (37 5 °C)  76   40  --  --  97 %  Nasal cannula  --   04/01/18 1200  99 3 °F (37 4 °C)  58   30  123/65  88  98 %  --  --   04/01/18 1100  99 7 °F (37 6 °C)  58   31  127/68  91  98 %  --  --   O2 Device: ETT at 04/01/18 0900   O2 Device: acvc at 04/01/18 0000   03/31/18 2130  98 8 °F (37 1 °C)  85   23  99/57  72  100 %  --  Lying   O2 Device: acvc vent at 03/31/18 2130 03/31/18 2000  98 8 °F (37 1 °C)   128   44  --  --  99 %  --  --   03/31/18 1900  --  87   26  --  --  100 %  --  --   03/31/18 1747  --  --  -- 182/83  --  --  --  --   03/31/18 1701  --  --   25  --  --  --  --  --   03/31/18 1648  --  --   25  --  --  --  --  --   03/31/18 1646  --   154  --  --  --  --  --  --     Abnormal Labs:   Ref Range & Units 03/31/18 1826   pH, Jai 7 300 - 7 400 7 448     pCO2, Jai 42 0 - 50 0 mm Hg 20 0     pO2, Jai 35 0 - 45 0 mm Hg 114 9     HCO3, Jai 24 - 30 mmol/L 13 5     Base Excess, Jai mmol/L -8 3    O2 Content, Jai ml/dL 16 6    O2 HGB, VENOUS 60 0 - 80 0 % 94 4        Ref Range & Units 03/31/18 2223   pH, Arterial 7 350 - 7 450 7 424    pCO2, Arterial 36 0 - 44 0 mm Hg 40 0    pO2, Arterial 75 0 - 129 0 mm Hg 142 7     HCO3, Arterial 22 0 - 28 0 mmol/L 25 6    Base Excess, Arterial mmol/L 1 1    O2 Content, Arterial 16 0 - 23 0 mL/dL 14 9     O2 HGB,Arterial  94 0 - 97 0 % 98 3     SOURCE   Radial, Left    NASIR TEST  Yes    Temperature Degrees Fehrenheit 98 5    Vent Type- AC  AC    AMMONIA <10  CHLOR 109  Creat 0 45  Calcium 7 1, 7 9  Total protein 5 5  Albumin 2 5  Phosphorus 2 6  Lactic acid 9 0  RBC 3 72  H/H 10 2/33 3  D dimer 7 38  Lithium <0 2  Free T3 7 38    Diagnostic Test Results:     3/31 CXR - Left basilar opacity concerning for atelectasis or pneumonitis  3/31 CXR post intubation - Apparent increased bilateral pulmonary opacities most pronounced at left lung base  3/31 CT chest, abd, pelvis - Bibasilar, left lingular infiltrate/atelectasis    4/1 CXR - Worsening left lower lobe infiltrate suspicious for pneumonia    3/31 US Thyroid pending     ED Treatment:   Medication Administration from 03/31/2018 1636 to 03/31/2018 2056    Date/Time Order Dose Route Action   03/31/2018 1729 haloperidol lactate (HALDOL) injection 5 mg 5 mg Intramuscular Given by Other   03/31/2018 1815 sodium chloride 0 9 % bolus 1,000 mL 1,000 mL Intravenous New Bag   03/31/2018 1756 OLANZapine (ZyPREXA) IM injection 10 mg 10 mg Intramuscular Given   03/31/2018 1755 LORazepam (ATIVAN) 2 mg/mL injection 1 mg 1 mg Intravenous Given 03/31/2018 1841 ziprasidone (GEODON) IM injection 20 mg 20 mg Intramuscular Given   03/31/2018 1842 sterile water injection **AcuDose Override Pull** 10 mL  Given   03/31/2018 1850 Succinylcholine Chloride 100 mg/5 mL syringe 100 mg 100 mg Intravenous Given by Other   03/31/2018 1850 etomidate (AMIDATE) 2 mg/mL injection 20 mg 20 mg Intravenous Given by Other   03/31/2018 1800 sodium chloride 0 9 % bolus 1,000 mL 1,000 mL Intravenous New Bag   03/31/2018 2035 propofol (DIPRIVAN) 1000 mg in 100 mL infusion (premix) 100 mcg/kg/min Intravenous Rate/Dose Change   03/31/2018 2025 propofol (DIPRIVAN) 1000 mg in 100 mL infusion (premix) 70 mcg/kg/min Intravenous Rate/Dose Change   03/31/2018 2015 propofol (DIPRIVAN) 1000 mg in 100 mL infusion (premix) 50 mcg/kg/min Intravenous New Bag   03/31/2018 2038 propofol (DIPRIVAN) 200 MG/20ML bolus injection 50 mg 50 mg Intravenous Given   03/31/2018 2037 labetalol (NORMODYNE) injection 20 mg 20 mg Intravenous Given   03/31/2018 2033 diazepam (VALIUM) injection 5 mg 5 mg Intravenous Given        Past Medical/Surgical History:    Active Ambulatory Problems     Diagnosis Date Noted    No Active Ambulatory Problems     Resolved Ambulatory Problems     Diagnosis Date Noted    No Resolved Ambulatory Problems     Past Medical History:   Diagnosis Date    Babesiosis     Bartonellosis     [facility] [address] [facility]     Hypothyroidism     [facility]     NSIP [facility]nonspecific interstitial pneumonitis) [facility])     Pulmonary fibrosis (HCC)     Pulmonary HTN      Admitting Diagnosis: Sinus tachycardia [R00 0]  Restlessness [M37 8]  Metabolic acidosis [Y84 4]  Abnormal TSH [R94 6]  Acute encephalopathy [G93 40]  Low serum thyroid stimulating hormone (TSH) [R94 6]  Withdrawal from other stimulant drug ([facility] [address] ) [F15 93]    Age/Sex: 50 y o  female    Assessment/Plan:   - Serotonin Syndrome  - Thyrotoxicosis  - Opiate Withdrawal  - Hypertensive Crisis  - Altered Mental Status    Ddx: Synthetic Drug self administration               Thyroid Storm                Psychiatric Etiology      Plan:                  Neuro:  Continue to monitor neurologic status  Patient is currently sedated on propofol and Precedex  Maintain BIZ between 20-40  Patient currently on Nimbex drip, maintain train of 4 at 3/4  Patient mental status change prior to intubation:   Will require psychiatric evaluation after extubation  No ocular or plantar clonus present                 CV:  Hypertensive crisis:  Systolic blood pressure did come down after administration of beta-blocker and adequate sedation  Continue to monitor cardiac status and vital signs  Utilize beta blockers for hypertension SBP >180 mmHg                 Lung:  Patient intubated for airway protection, continue mechanical ventilation, goal to wean and extubate ASAP  Provide for tracheal toilet, bronchodilators as needed                 GI:  Pepcid for PUD prophylaxis                 FEN:  Monitor and replete electrolytes as indicated  Keep NPO for now                 : Indwelling Luna catheter inserted  Monitor I&O hourly  Monitor renal indices                 ID:  No indication for antibiotic therapy at this time                 Heme:  Continue to monitor hemoglobin hematocrit and platelet count  Workup for DIC in progress                 Endo:  Thyrotoxicosis versus serotonin syndrome  TSH <0 007, free T4 1 38, free T3 7 38 -----> R/O T3 Toxicosis / start propranolol and Solu-Cortef    CK 70                 Msk/Skin:  Reposition q 2 hours                 Disposition:  Remain in ICU at this time     VTE Pharmacologic Prophylaxis: Heparin  VTE Mechanical Prophylaxis: sequential compression device     Invasive lines and devices:       Invasive Devices            Peripheral Intravenous Line                     Peripheral IV 03/31/18 Left Forearm less than 1 day      Peripheral IV 03/31/18 Right External Jugular less than 1 day        Airway                     ETT  Hi-Lo; Cuffed;Oral 8 mm less than 1 day               Code Status: Level 1 - Full Code  given critical illness, patient length of stay will require greater than two midnights      Admission Orders:  Scheduled Meds:   Current Facility-Administered Medications:  chlorhexidine 15 mL Swish [employer] Spit Q12H [facility] Maik Hammond PA-C    dexmedetomidine 0 1-1 4 mcg/kg/hr Intravenous Titrated Maik Hammond PA-C Last Rate: 0 3 mcg/kg/hr (04/01/18 1341)   diazepam 5 mg Intravenous Q1H PRN Maik Hammond PA-C    heparin (porcine) 5,000 Units Subcutaneous [facility] Idris Burks PA-C    predniSONE 50 mg Oral Daily VICKI Silveira      Continuous Infusions:   dexmedetomidine 0 1-1 4 mcg/kg/hr Last Rate: 0 3 mcg/kg/hr (04/01/18 1341)   Nimbex was infusing now off   Propofol was infusing now off      PRN Meds: diazepam IV x3 since admission     ICU   Mechanical ventilation   SCDs  Implement early mobilizaton   OGT   Respiratory protocol   Diet NPO   US thyroid   _______________________________  4/1 Critical Care Progress Note    Serotonin syndrome  Active Problems:    Hypertensive crisis    Thyrotoxicosis with thyrotoxic crisis    Opiate withdrawal ([facility] [address] )    Opioid dependence with withdrawal ([facility] [address] )    Altered mental status  Resolved Problems:    * No resolved hospital problems  *        Neuro:  Continue to monitor neurologic status  Patient is currently sedated on propofol and Precedex  Maintain BIZ between 20-40  Patient currently on Nimbex drip, maintain train of 4 at 3/4  Patient mental status change prior to intubation:   Will require psychiatric evaluation after extubation  No ocular or plantar clonus present                 CV:  Hypertensive crisis:  Systolic blood pressure did come down after administration of beta-blocker and adequate sedation     Continue to monitor cardiac status and vital signs  Utilize beta blockers for hypertension SBP >180 mmHg                 Lung:  Patient intubated for airway protection, continue mechanical ventilation, goal to wean and extubate ASAP  Provide for tracheal toilet, bronchodilators as needed                 GI:  Pepcid for PUD prophylaxis                 FEN:  Monitor and replete electrolytes as indicated  Keep NPO for now                 :  Indwelling Luna catheter inserted  Monitor I&O hourly  Monitor renal indices                 ID:  No indication for antibiotic therapy at this time                 Heme:  Continue to monitor hemoglobin hematocrit and platelet count  Workup for DIC in progress                 Endo:  Thyrotoxicosis versus serotonin syndrome  TSH <0 007, free T4 1 38, free T3 7 38 -----> R/O T3 Toxicosis / start propranolol and Solu-Cortef    CK 70                 Msk/Skin:  Reposition q 2 hours                 Disposition:  Remain in ICU at this time      Code Status: Level 1 - Full Code  Chief Complaint: Patient sedated  24 Hour Events: Admitted with acute change in mental status, decreasing sedation and Nimbex, T3 elevated in presence of negligible level of TSH       Thank you,  [facility] in the [facility] by Robb Gilliam for 2017  Network Utilization Review Department  Phone: [phone]; Fax [phone]  ATTENTION: The Network Utilization Review Department is now centralized for our 7 Facilities  Make a note that we have a new phone and fax numbers for our Department  Please call with any questions or concerns to [phone] and carefully follow the prompts so that you are directed to the right person  All voicemails are confidential  Fax any determinations, approvals, denials, and requests for initial or continue stay review clinical to [phone]  Due to HIGH CALL volume, it would be easier if you could please send faxed requests to expedite your requests and in part, help us provide discharge notifications faster

## 2018-04-01 NOTE — PROGRESS NOTES
Progress Note - Critical Care   Octavia Mercado 50 y o  female MRN: [id number]  Unit/Bed#:  Encounter: [id number]    Attending Physician: Abram Campos, DO      ______________________________________________________________________  Assessment and Plan:   Principal Problem:    Serotonin syndrome  Active Problems:    Hypertensive crisis    Thyrotoxicosis with thyrotoxic crisis    Opiate withdrawal ([facility] [address] )    Opioid dependence with withdrawal ([facility] [address] )    Altered mental status  Resolved Problems:    * No resolved hospital problems  *       Neuro:  Continue to monitor neurologic status  Patient is currently sedated on propofol and Precedex  Maintain BIZ between 20-40  Patient currently on Nimbex drip, maintain train of 4 at 3/4  Patient mental status change prior to intubation:   Will require psychiatric evaluation after extubation  No ocular or plantar clonus present                 CV:  Hypertensive crisis:  Systolic blood pressure did come down after administration of beta-blocker and adequate sedation  Continue to monitor cardiac status and vital signs  Utilize beta blockers for hypertension SBP >180 mmHg                 Lung:  Patient intubated for airway protection, continue mechanical ventilation, goal to wean and extubate ASAP  Provide for tracheal toilet, bronchodilators as needed                 GI:  Pepcid for PUD prophylaxis                 FEN:  Monitor and replete electrolytes as indicated  Keep NPO for now                 :   Indwelling Luna catheter inserted  Monitor I&O hourly  Monitor renal indices                 ID:  No indication for antibiotic therapy at this time                 Heme:  Continue to monitor hemoglobin hematocrit and platelet count  Workup for DIC in progress                 Endo:  Thyrotoxicosis versus serotonin syndrome  TSH <0 007, free T4 1 38, free T3 7 38 -----> R/O T3 Toxicosis / start propranolol and Solu-Cortef    CK 70                 Msk/Skin:  Reposition q 2 hours                 Disposition:  Remain in ICU at this time        Code Status: Level 1 - Full Code    Counseling / Coordination of Care  Total Critical Care time spent 45 minutes excluding procedures, teaching and family updates  ______________________________________________________________________    Chief Complaint: Patient sedated    24 Hour Events: Admitted with acute change in mental status, decreasing sedation and Nimbex, T3 elevated in presence of negligible level of TSH      ______________________________________________________________________    Physical Exam:     Constitutional: She is intubated  HENT:   Head: Normocephalic and atraumatic  Mouth/Throat: Oropharyngeal exudate present  Eyes: Pupils are equal, round, and reactive to light  Right eye exhibits no discharge  Left eye exhibits no discharge  No scleral icterus  Neck: Normal range of motion  Neck supple  No JVD present  Thyromegaly present  Cardiovascular: Regular rhythm  Tachycardia present  Exam reveals no gallop and no friction rub  No murmur heard  Pulses:       Carotid pulses are 3+ on the right side, and 3+ on the left side  Radial pulses are 3+ on the right side, and 3+ on the left side  Femoral pulses are 3+ on the right side, and 3+ on the left side  Pulmonary/Chest: No stridor  Tachypnea noted  She is intubated  She has no wheezes  She has no rales  Abdominal: Soft  She exhibits no distension and no mass  There is no guarding  Musculoskeletal: She exhibits no edema or deformity  Lymphadenopathy:     She has no cervical adenopathy  Neurological: She is unresponsive  She displays abnormal reflex  Reflex Scores:       Bicep reflexes are 4+ on the right side and 4+ on the left side  Brachioradialis reflexes are 4+ on the right side and 4+ on the left side  Patellar reflexes are 4+ on the right side and 4+ on the left side         Achilles reflexes are 4+ on the right side and 4+ on the left side  Skin: Skin is warm  No rash noted  She is diaphoretic  No erythema  No pallor  ______________________________________________________________________  Vitals:    18 0000 18 0046 18 0100 18 0112   BP: 107/62  102/59 106/62   BP Location: Right arm      Pulse: 68  68 67   Resp: 22  22 22   Temp: 98 5 °F (36 9 °C)  97 9 °F (36 6 °C) 97 9 °F (36 6 °C)   TempSrc: Oral      SpO2: 99% 99% 98% 98%   Weight:                  Temperature:   Temp (24hrs), Av 3 °F (36 8 °C), Min:97 9 °F (36 6 °C), Max:98 8 °F (37 1 °C)    Current Temperature: 97 9 °F (36 6 °C)  Weights:   IBW: -92 5 kg    There is no height or weight on file to calculate BMI    Weight (last 2 days)     Date/Time   Weight    18 2130  70 4 (155 2)    18 1844  67 8 (149 47)            Hemodynamic Monitoring:  N/A     Non-Invasive/Invasive Ventilation Settings:  Respiratory    Lab Data (Last 4 hours)       2223            pH, Arterial       7 424           pCO2, Arterial       40 0           pO2, Arterial       (!)142 7           HCO3, Arterial       25 6           Base Excess, Arterial       1 1                O2/Vent Data           Most Recent        Drager Vent Mode   AC/VC+      Resp Rate (BPM) (BPM)   22      VT (mL) (mL)   350      Insp Time (S) (S)   1      FIO2 (%) (%)   35      PEEP (cmH2O) (cmH2O)   5      Rise Time (%) (%)   0 2      MV (Obs)   7 23                Lab Results   Component Value Date    PHART 7 424 2018    UIE8MVI 40 0 2018    PO2ART 142 7 (H) 2018    PPG5TFL 25 6 2018    BEART 1 1 2018    SOURCE Radial, Left 2018     SpO2: SpO2: 98 %  Intake and Outputs:  I/O       701 -  0700  07 -  0700    I V  (mL/kg)  125 5 (1 8)    IV Piggyback  3000    Total Intake(mL/kg)  3125 5 (44 4)    Urine (mL/kg/hr)  375    Emesis/NG output  50    Total Output   425    Net   +2700 5              UOP: qs/hour   Nutrition:        Diet Orders Start     Ordered    03/31/18 2043  Diet NPO  Diet effective now     Question Answer Comment   Diet Type NPO    RD to adjust diet per protocol? No        03/31/18 2050          Labs:     Results from last 7 days  Lab Units 03/31/18  2130 03/31/18  1646   WBC Thousand/uL  --  9 05   HEMOGLOBIN g/dL  --  12 3   HEMATOCRIT %  --  40 1   PLATELETS Thousands/uL 263 373   NEUTROS PCT %  --  87*   MONOS PCT %  --  2*       Results from last 7 days  Lab Units 03/31/18  2146 03/31/18  1646   SODIUM mmol/L 142 140   POTASSIUM mmol/L 4 5 3 9   CHLORIDE mmol/L 109* 101   CO2 mmol/L 25 20*   BUN mg/dL 9 9   CREATININE mg/dL 0 45* 0 88   CALCIUM mg/dL 7 1* 8 8   TOTAL PROTEIN g/dL  --  6 9   BILIRUBIN TOTAL mg/dL  --  0 20   ALK PHOS U/L  --  76   ALT U/L  --  27   AST U/L  --  20   GLUCOSE RANDOM mg/dL 89 158*       Results from last 7 days  Lab Units 03/31/18  1646   MAGNESIUM mg/dL 1 6     No results found for: PHOS     Results from last 7 days  Lab Units 03/31/18  2211 03/31/18  1646   INR  1 09 0 95   PTT seconds 26 27       0  Lab Value Date/Time   TROPONINI <0 02 03/31/2018 1646       Results from last 7 days  Lab Units 04/01/18  0006 03/31/18  2135 03/31/18  1813   LACTIC ACID mmol/L 0 8 1 3 9 0*     ABG:  Lab Results   Component Value Date    PHART 7 424 03/31/2018    RAU9TQS 40 0 03/31/2018    PO2ART 142 7 (H) 03/31/2018    DZQ2HTB 25 6 03/31/2018    BEART 1 1 03/31/2018    SOURCE Radial, Left 03/31/2018     Imaging: CT BRAIN - WITHOUT CONTRAST     INDICATION:   AMS     COMPARISON:  None      TECHNIQUE:  CT examination of the brain was performed  In addition to axial images, coronal 2D reformatted images were created and submitted for interpretation        Radiation dose length product (DLP) for this visit:  1071 mGy-cm     This examination, like all CT scans performed in the [place], was performed utilizing techniques to minimize radiation dose exposure, including the use of iterative reconstruction and automated exposure control        IMAGE QUALITY:  Diagnostic      FINDINGS:     PARENCHYMA:  No intracranial mass, mass effect or midline shift  No CT signs of acute infarction  No acute intracranial hemorrhage      VENTRICLES AND EXTRA-AXIAL SPACES:  Normal for the patient's age      VISUALIZED ORBITS AND PARANASAL SINUSES:  Opacification of left maxillary sinus  Additional scattered sinus mucosal thickening      CALVARIUM AND EXTRACRANIAL SOFT TISSUES:  Normal      IMPRESSION:     No acute intracranial abnormality  I have personally reviewed pertinent reports      EKG: Sinus tach  Micro:  No results found for: Carline Rivas, WOUNDCULT, SPUTUMCULTUR  Allergies: No Known Allergies  Medications:   Scheduled Meds:  Current Facility-Administered Medications:  cisatracurium (NIMBEX) in 0 9 % sodium chloride 500 mL infusion 0 1-5 mcg/kg/min Intravenous Titrated Floyde Altes, PA-C Last Rate: Stopped (04/01/18 0112)   And        artificial tear  Both Eyes Q2H Floyde Altes, PA-C    chlorhexidine 15 mL Swish & Spit Q12H [address] Floyde Altes, PA-C    dexmedetomidine 0 1-1 4 mcg/kg/hr Intravenous Titrated Floyde Altes, PA-C Last Rate: 1 4 mcg/kg/hr (04/01/18 0014)   diazepam 5 mg Intravenous Q1H PRN Floyde Altes, PA-C    heparin (porcine) 5,000 Units Subcutaneous [facility] Floyde Altes, PA-C    hydrocortisone sodium succinate 100 mg Intravenous [facility] [address] Floyde Altes, PA-C    propofol 5-50 mcg/kg/min Intravenous Titrated Eloisa Rolling, DO Last Rate: 80 mcg/kg/min (04/01/18 0000)   propofol 5-50 mcg/kg/min Intravenous Titrated Floyde Altes, PA-C Last Rate: Stopped (03/31/18 2204)   propranolol 40 mg Oral [facility] Floyde Altes, PA-C    sterile water          Continuous Infusions:  cisatracurium (NIMBEX) in 0 9 % sodium chloride 500 mL infusion 0 1-5 mcg/kg/min Last Rate: Stopped (04/01/18 0112)   dexmedetomidine 0 1-1 4 mcg/kg/hr Last Rate: 1 4 mcg/kg/hr (04/01/18 0014)   propofol 5-50 mcg/kg/min Last Rate: 80 mcg/kg/min (04/01/18 0000)   propofol 5-50 mcg/kg/min Last Rate: Stopped (03/31/18 2204)     PRN Meds:    diazepam 5 mg Q1H PRN     VTE Pharmacologic Prophylaxis: Heparin  VTE Mechanical Prophylaxis: sequential compression device  Invasive lines and devices: Invasive Devices     Peripheral Intravenous Line            Peripheral IV 03/31/18 Left Forearm less than 1 day    Peripheral IV 03/31/18 Left Wrist less than 1 day    Peripheral IV 03/31/18 Right External Jugular less than 1 day    Peripheral IV 03/31/18 Right Wrist less than 1 day          Drain            NG/OG/Enteral Tube Orogastric Right mouth less than 1 day    Urethral Catheter Temperature probe less than 1 day          Airway            ETT  Hi-Lo; Cuffed;Oral 8 mm less than 1 day                     Portions of the record may have been created with voice recognition software  Occasional wrong word or "sound a like" substitutions may have occurred due to the inherent limitations of voice recognition software  Read the chart carefully and recognize, using context, where substitutions have occurred      Chaya Puente PA-C

## 2018-04-01 NOTE — H&P
History and Physical - Critical Care  Boris Chandler 50 y o  female MRN: [id number]  Unit/Bed#:  Encounter: [id number]     Reason for Admission / Chief Complaint:  Thyrotoxicosis / Opiate Withdrawal / Serotonin Syndrome   History of Present Illness:  Boris Chandler is a 50 y o  female with a past Hx of illicit IV drug abuse, admitting to heroin use and another agent that she did not know the name of, who presented to the ED c/o trouble breathing, headache, and palpitations  She had stated that she bought Suboxone (questionable content) off of the street and began using it at ~1500 hrs, since that time cannot control her legs, affect very anxious, her last heroin use was yesterday  While in the ED lab studies and other diagnostics were obtained with critical results to follow: Tox positive for opiates, , lactic acid 9 0,  TSH <0 007  A CT Head, neck, chest, abdomen, and pelvis were obtained which had unremarkable results  While in the ED this patient became non-controlable of actions and could not be redirected requiring high doses of multiple sedative agents, she was intubated for airway protect and respiratory control  CCM was contacted to assume patient care  Upon exam, this patient was received in ED 13 intubated and on mechanical ventilation, on propofol 50 mcg,  mmHG, Labetolol 20 mg ordered, patient noted to not being well sedated on this dosing, increased to 100 mcg, patient noted to be chewing on endotracheal tube despite this increase, patient given 10 mg vecuronium, Nimbex drip ordered  Patient transferred to the ICU for continuation of care  After arriving in the ICU a BIZ monitor and Train of four baseline obtained,patient was started on Precedex and on the Nimbex drip  History obtained from chart review       Past Medical History:  Past Medical History:   Diagnosis Date    Emphysema/COPD [facility])     Pulmonary fibrosis ([facility] [address] )         Past Surgical History:  No past surgical history on file  Past Family History:  No family history on file       Social History:  History   Smoking Status    Smoker, Current Status Unknown   Smokeless Tobacco    Not on file     History   Alcohol Use    Yes     History   Drug Use    Types: Heroin     Comment: xanax     Marital Status:    Exercise History: n/a     Medications:  Current Facility-Administered Medications   Medication Dose Route Frequency    cisatracurium (NIMBEX) 200 mg in sodium chloride 0 9 % 500 mL infusion  0 1-5 mcg/kg/min Intravenous Titrated    And    artificial tear (LUBRIFRESH P M ) ophthalmic ointment   Both Eyes Q2H    chlorhexidine (PERIDEX) 0 12 % oral rinse 15 mL  15 mL Swish & Spit Q12H [address]    dexmedetomidine (PRECEDEX) 200 mcg in sodium chloride 0 9 % 50 mL infusion  0 1-1 4 mcg/kg/hr Intravenous Titrated    diazepam (VALIUM) injection 5 mg  5 mg Intravenous Q1H PRN    heparin (porcine) subcutaneous injection 5,000 Units  5,000 Units Subcutaneous Q8H [address]    lactated ringers bolus 1,000 mL  1,000 mL Intravenous Once    propofol (DIPRIVAN) 1000 mg in 100 mL infusion (premix)  5-50 mcg/kg/min Intravenous Titrated    propofol (DIPRIVAN) 1000 mg in 100 mL infusion (premix)  5-50 mcg/kg/min Intravenous Titrated    sterile water injection **AcuDose Override Pull**         Home medications:  Prior to Admission medications    Not on File     Allergies:  No Known Allergies     ROS:   Review of Systems   Reason unable to perform ROS: sedated and intubated at time of critical care exam         Vitals:  Vitals:    18 1710 18 1747 18 1844 18 1900   BP:  (!) 182/83     Pulse:    87   Resp:    (!) 26   Temp: 97 9 °F (36 6 °C)      TempSrc: Oral      SpO2:    100%   Weight:   67 8 kg (149 lb 7 6 oz)      Temperature:   Temp (24hrs), Av 9 °F (36 6 °C), Min:97 9 °F (36 6 °C), Max:97 9 °F (36 6 °C)    Current: Temperature: 97 9 °F (36 6 °C)     Weights:   IBW: -92 5 kg  There is no height or weight on file to calculate BMI  Hemodynamic Monitoring:  N/A     Non-Invasive/Invasive Ventilation Settings:  Respiratory    Lab Data (Last 4 hours)    None         O2/Vent Data (Last 4 hours)      03/31 1900          Drager Vent Mode AC/VC       Resp Rate (BPM) (BPM) 25       Vt (mL) (mL) 360       FIO2 (%) (%) 50       PEEP (cmH2O) (cmH2O) 5       MV 11 5                 No results found for: PHART, OUY9YRU, PO2ART, XMT4UBA, S6FVRCSY, BEART, SOURCE  SpO2: SpO2: 100 %     Physical Exam:  Physical Exam   Constitutional: She is intubated  HENT:   Head: Normocephalic and atraumatic  Mouth/Throat: Oropharyngeal exudate present  Eyes: Pupils are equal, round, and reactive to light  Right eye exhibits no discharge  Left eye exhibits no discharge  No scleral icterus  Neck: Normal range of motion  Neck supple  No JVD present  Thyromegaly present  Cardiovascular: Regular rhythm  Tachycardia present  Exam reveals no gallop and no friction rub  No murmur heard  Pulses:       Carotid pulses are 3+ on the right side, and 3+ on the left side  Radial pulses are 3+ on the right side, and 3+ on the left side  Femoral pulses are 3+ on the right side, and 3+ on the left side  Pulmonary/Chest: No stridor  Tachypnea noted  She is intubated  She has no wheezes  She has no rales  Abdominal: Soft  She exhibits no distension and no mass  There is no guarding  Musculoskeletal: She exhibits no edema or deformity  Lymphadenopathy:     She has no cervical adenopathy  Neurological: She is unresponsive  She displays abnormal reflex  Reflex Scores:       Bicep reflexes are 4+ on the right side and 4+ on the left side  Brachioradialis reflexes are 4+ on the right side and 4+ on the left side  Patellar reflexes are 4+ on the right side and 4+ on the left side  Achilles reflexes are 4+ on the right side and 4+ on the left side  Skin: Skin is warm  No rash noted  She is diaphoretic  No erythema   No pallor  Labs:    Results from last 7 days  Lab Units 03/31/18  2130 03/31/18  1646   WBC Thousand/uL  --  9 05   HEMOGLOBIN g/dL  --  12 3   HEMATOCRIT %  --  40 1   PLATELETS Thousands/uL 263 373   NEUTROS PCT %  --  87*   MONOS PCT %  --  2*      Results from last 7 days  Lab Units 03/31/18  1646   SODIUM mmol/L 140   POTASSIUM mmol/L 3 9   CHLORIDE mmol/L 101   CO2 mmol/L 20*   BUN mg/dL 9   CREATININE mg/dL 0 88   CALCIUM mg/dL 8 8   TOTAL PROTEIN g/dL 6 9   BILIRUBIN TOTAL mg/dL 0 20   ALK PHOS U/L 76   ALT U/L 27   AST U/L 20   GLUCOSE RANDOM mg/dL 158*       Results from last 7 days  Lab Units 03/31/18  1646   MAGNESIUM mg/dL 1 6            Results from last 7 days  Lab Units 03/31/18  1646   INR  0 95   PTT seconds 27       Results from last 7 days  Lab Units 03/31/18  1813   LACTIC ACID mmol/L 9 0*       0  Lab Value Date/Time   TROPONINI <0 02 03/31/2018 1646        Imaging: CT CHEST, ABDOMEN AND PELVIS WITHOUT IV CONTRAST     INDICATION:   Tachycardia, altered mental status, short of breath, rule out PE  Patient also complaining of abdominal pain      COMPARISON: None      TECHNIQUE: CT examination of the chest, abdomen and pelvis was performed without intravenous contrast   Axial, sagittal, and coronal 2D reformatted images were created from the source data and submitted for interpretation       Radiation dose length product (DLP) for this visit:  432 mGy-cm   This examination, like all CT scans performed in the [facility], was performed utilizing techniques to minimize radiation dose exposure, including the use of iterative   reconstruction and automated exposure control       Enteric contrast was not administered       FINDINGS: Limited exam due to motion      CHEST     LUNGS:  Motion artifact  ET tube tip above the karey  Bibasilar, left lingular infiltrate/atelectasis  Subpleural blebs in the upper lobes bilaterally    Scattered subpleural chronic interstitial lung densities  There is no tracheal or endobronchial   lesion      PLEURA:  Unremarkable      HEART/GREAT VESSELS:  Unremarkable for patient's age      MEDIASTINUM AND AMBAR:  Unremarkable      CHEST WALL AND LOWER NECK:   Unremarkable      ABDOMEN     LIVER/BILIARY TREE:  Unremarkable      GALLBLADDER:  No calcified gallstones  No pericholecystic inflammatory change      SPLEEN:  Unremarkable      PANCREAS:  Unremarkable      ADRENAL GLANDS:  Unremarkable      KIDNEYS/URETERS:  Unremarkable  No hydronephrosis      STOMACH AND BOWEL:  Unremarkable  Enteric tube tip in the duodenum      APPENDIX:  No findings to suggest appendicitis      ABDOMINOPELVIC CAVITY:  No ascites or free intraperitoneal air  No lymphadenopathy      VESSELS:  Unremarkable for patient's age      PELVIS     REPRODUCTIVE ORGANS:  Unremarkable for patient's age      URINARY BLADDER:  Luna catheter in place      ABDOMINAL WALL/INGUINAL REGIONS:  Unremarkable      OSSEOUS STRUCTURES:  No acute fracture or destructive osseous lesion  Apparent cortical step-off in the manubrium is likely related to motion artifact  Slight anterolisthesis L4 on L5      IMPRESSION:  1  Limited exam due to motion  Bibasilar, left lingular infiltrate/atelectasis  2   No acute intra-abdominal pathology  I have personally reviewed pertinent reports  EKG: sinus tachycardia This was personally reviewed by myself     Micro:  No results found for: Johana Trevino, WOUNDCULT, SPUTUMCULTUR    Assessment:     - Serotonin Syndrome  - Thyrotoxicosis  - Opiate Withdrawal  - Hypertensive Crisis  - Altered Mental Status    Ddx: Synthetic Drug self administration               Thyroid Storm                Psychiatric Etiology        Plan:                  Neuro:  Continue to monitor neurologic status  Patient is currently sedated on propofol and Precedex  Maintain BIZ between 20-40  Patient currently on Nimbex drip, maintain train of 4 at 3/4  Patient mental status change prior to intubation:   Will require psychiatric evaluation after extubation  No ocular or plantar clonus present                 CV:  Hypertensive crisis:  Systolic blood pressure did come down after administration of beta-blocker and adequate sedation  Continue to monitor cardiac status and vital signs  Utilize beta blockers for hypertension SBP >180 mmHg                 Lung:  Patient intubated for airway protection, continue mechanical ventilation, goal to wean and extubate ASAP  Provide for tracheal toilet, bronchodilators as needed                 GI:  Pepcid for PUD prophylaxis                 FEN:  Monitor and replete electrolytes as indicated  Keep NPO for now                 :  Indwelling Luna catheter inserted  Monitor I&O hourly  Monitor renal indices                 ID:  No indication for antibiotic therapy at this time                 Heme:  Continue to monitor hemoglobin hematocrit and platelet count  Workup for DIC in progress                 Endo:  Thyrotoxicosis versus serotonin syndrome  TSH <0 007, free T4 1 38, free T3 7 38 -----> R/O T3 Toxicosis / start propranolol and Solu-Cortef    CK 70                 Msk/Skin:  Reposition q 2 hours                 Disposition:  Remain in ICU at this time     VTE Pharmacologic Prophylaxis: Heparin  VTE Mechanical Prophylaxis: sequential compression device     Invasive lines and devices: Invasive Devices     Peripheral Intravenous Line            Peripheral IV 03/31/18 Left Forearm less than 1 day    Peripheral IV 03/31/18 Right External Jugular less than 1 day          Airway            ETT  Hi-Lo; Cuffed;Oral 8 mm less than 1 day                 Code Status: Level 1 - Full Code  POA:    POLST:       Given critical illness, patient length of stay will require greater than two midnights  Counseling / Coordination of Care  Total Critical Care time spent 120 minutes excluding procedures, teaching and family updates         Portions of the record may have been created with voice recognition software  Occasional wrong word or "sound a like" substitutions may have occurred due to the inherent limitations of voice recognition software  Read the chart carefully and recognize, using context, where substitutions have occurred          Rj More PA-C

## 2018-04-01 NOTE — ED NOTES
As per critical care PA,  pt is in soft restraints due to pulling out tubes and IVs     Serena Gay RN  03/31/18 2055

## 2018-04-02 ENCOUNTER — APPOINTMENT (INPATIENT)
Dept: ULTRASOUND IMAGING | Facility: HOSPITAL | Age: 48
DRG: 896 | End: 2018-04-02
Payer: MEDICARE

## 2018-04-02 PROBLEM — I16.9 HYPERTENSIVE CRISIS: Status: RESOLVED | Noted: 2018-04-01 | Resolved: 2018-04-02

## 2018-04-02 LAB
ACETONE SERPL-MCNC: NEGATIVE MG/DL
ALBUMIN SERPL BCP-MCNC: 2.3 G/DL (ref 3.5–5)
ALP SERPL-CCNC: 52 U/L (ref 46–116)
ALT SERPL W P-5'-P-CCNC: 23 U/L (ref 12–78)
ANION GAP SERPL CALCULATED.3IONS-SCNC: 7 MMOL/L (ref 4–13)
APTT PPP: 29 SECONDS (ref 23–35)
AST SERPL W P-5'-P-CCNC: 18 U/L (ref 5–45)
ATRIAL RATE: 136 BPM
ATRIAL RATE: 155 BPM
BASOPHILS # BLD AUTO: 0.01 THOUSANDS/ΜL (ref 0–0.1)
BASOPHILS NFR BLD AUTO: 0 % (ref 0–1)
BILIRUB SERPL-MCNC: 0.3 MG/DL (ref 0.2–1)
BUN SERPL-MCNC: 13 MG/DL (ref 5–25)
CALCIUM SERPL-MCNC: 8 MG/DL (ref 8.3–10.1)
CHLORIDE SERPL-SCNC: 105 MMOL/L (ref 100–108)
CO2 SERPL-SCNC: 26 MMOL/L (ref 21–32)
CREAT SERPL-MCNC: 0.41 MG/DL (ref 0.6–1.3)
EOSINOPHIL # BLD AUTO: 0.01 THOUSAND/ΜL (ref 0–0.61)
EOSINOPHIL NFR BLD AUTO: 0 % (ref 0–6)
ERYTHROCYTE [DISTWIDTH] IN BLOOD BY AUTOMATED COUNT: 15.9 % (ref 11.6–15.1)
ETHANOL SERPL-MSCNC: NEGATIVE
GFR SERPL CREATININE-BSD FRML MDRD: 122 ML/MIN/1.73SQ M
GLUCOSE SERPL-MCNC: 102 MG/DL (ref 65–140)
GLUCOSE SERPL-MCNC: 85 MG/DL (ref 65–140)
HCT VFR BLD AUTO: 31.5 % (ref 34.8–46.1)
HGB BLD-MCNC: 9.9 G/DL (ref 11.5–15.4)
INR PPP: 1.09 (ref 0.86–1.16)
ISOPROPANOL SPEC-SCNC: NEGATIVE MMOL/L
LYMPHOCYTES # BLD AUTO: 1.39 THOUSANDS/ΜL (ref 0.6–4.47)
LYMPHOCYTES NFR BLD AUTO: 19 % (ref 14–44)
MAGNESIUM SERPL-MCNC: 2 MG/DL (ref 1.6–2.6)
MCH RBC QN AUTO: 28 PG (ref 26.8–34.3)
MCHC RBC AUTO-ENTMCNC: 31.4 G/DL (ref 31.4–37.4)
MCV RBC AUTO: 89 FL (ref 82–98)
METHANOL SERPL-MCNC: NEGATIVE MG/DL
MONOCYTES # BLD AUTO: 0.67 THOUSAND/ΜL (ref 0.17–1.22)
MONOCYTES NFR BLD AUTO: 9 % (ref 4–12)
NEUTROPHILS # BLD AUTO: 5.21 THOUSANDS/ΜL (ref 1.85–7.62)
NEUTS SEG NFR BLD AUTO: 71 % (ref 43–75)
NRBC BLD AUTO-RTO: 0 /100 WBCS
P AXIS: 104 DEGREES
P AXIS: 81 DEGREES
PHOSPHATE SERPL-MCNC: 3.7 MG/DL (ref 2.7–4.5)
PLATELET # BLD AUTO: 226 THOUSANDS/UL (ref 149–390)
PMV BLD AUTO: 10.1 FL (ref 8.9–12.7)
POTASSIUM SERPL-SCNC: 4.1 MMOL/L (ref 3.5–5.3)
PR INTERVAL: 100 MS
PR INTERVAL: 74 MS
PROT SERPL-MCNC: 5.2 G/DL (ref 6.4–8.2)
PROTHROMBIN TIME: 14.3 SECONDS (ref 12.1–14.4)
QRS AXIS: 48 DEGREES
QRS AXIS: 67 DEGREES
QRSD INTERVAL: 112 MS
QRSD INTERVAL: 118 MS
QT INTERVAL: 282 MS
QT INTERVAL: 322 MS
QTC INTERVAL: 453 MS
QTC INTERVAL: 484 MS
RBC # BLD AUTO: 3.53 MILLION/UL (ref 3.81–5.12)
SODIUM SERPL-SCNC: 138 MMOL/L (ref 136–145)
T WAVE AXIS: -86 DEGREES
T WAVE AXIS: 248 DEGREES
VENTRICULAR RATE: 136 BPM
VENTRICULAR RATE: 155 BPM
WBC # BLD AUTO: 7.32 THOUSAND/UL (ref 4.31–10.16)

## 2018-04-02 PROCEDURE — 85610 PROTHROMBIN TIME: CPT | Performed by: PHYSICIAN ASSISTANT

## 2018-04-02 PROCEDURE — 85025 COMPLETE CBC W/AUTO DIFF WBC: CPT | Performed by: PHYSICIAN ASSISTANT

## 2018-04-02 PROCEDURE — 82948 REAGENT STRIP/BLOOD GLUCOSE: CPT

## 2018-04-02 PROCEDURE — 83735 ASSAY OF MAGNESIUM: CPT | Performed by: PHYSICIAN ASSISTANT

## 2018-04-02 PROCEDURE — 85730 THROMBOPLASTIN TIME PARTIAL: CPT | Performed by: PHYSICIAN ASSISTANT

## 2018-04-02 PROCEDURE — 84100 ASSAY OF PHOSPHORUS: CPT | Performed by: PHYSICIAN ASSISTANT

## 2018-04-02 PROCEDURE — 93010 ELECTROCARDIOGRAM REPORT: CPT | Performed by: INTERNAL MEDICINE

## 2018-04-02 PROCEDURE — 80053 COMPREHEN METABOLIC PANEL: CPT | Performed by: PHYSICIAN ASSISTANT

## 2018-04-02 PROCEDURE — 76536 US EXAM OF HEAD AND NECK: CPT

## 2018-04-02 RX ORDER — VENLAFAXINE HYDROCHLORIDE 75 MG/1
75 CAPSULE, EXTENDED RELEASE ORAL DAILY
Status: DISCONTINUED | OUTPATIENT
Start: 2018-04-02 | End: 2018-04-03 | Stop reason: HOSPADM

## 2018-04-02 RX ORDER — BUPROPION HYDROCHLORIDE 150 MG/1
150 TABLET ORAL DAILY
Status: DISCONTINUED | OUTPATIENT
Start: 2018-04-02 | End: 2018-04-03 | Stop reason: HOSPADM

## 2018-04-02 RX ORDER — SULFAMETHOXAZOLE AND TRIMETHOPRIM 800; 160 MG/1; MG/1
1 TABLET ORAL EVERY 12 HOURS SCHEDULED
Status: DISCONTINUED | OUTPATIENT
Start: 2018-04-02 | End: 2018-04-03 | Stop reason: HOSPADM

## 2018-04-02 RX ORDER — DEXTROAMPHETAMINE SACCHARATE, AMPHETAMINE ASPARTATE, DEXTROAMPHETAMINE SULFATE AND AMPHETAMINE SULFATE 2.5; 2.5; 2.5; 2.5 MG/1; MG/1; MG/1; MG/1
10 TABLET ORAL
Status: DISCONTINUED | OUTPATIENT
Start: 2018-04-02 | End: 2018-04-03 | Stop reason: HOSPADM

## 2018-04-02 RX ORDER — PREDNISONE 10 MG/1
10 TABLET ORAL DAILY
Status: DISCONTINUED | OUTPATIENT
Start: 2018-04-02 | End: 2018-04-03 | Stop reason: HOSPADM

## 2018-04-02 RX ORDER — AMOXICILLIN 250 MG
1 CAPSULE ORAL
Status: DISCONTINUED | OUTPATIENT
Start: 2018-04-02 | End: 2018-04-03 | Stop reason: HOSPADM

## 2018-04-02 RX ORDER — BUPROPION HYDROCHLORIDE 150 MG/1
150 TABLET, EXTENDED RELEASE ORAL DAILY
Status: DISCONTINUED | OUTPATIENT
Start: 2018-04-02 | End: 2018-04-02 | Stop reason: SDUPTHER

## 2018-04-02 RX ORDER — FUROSEMIDE 40 MG/1
40 TABLET ORAL DAILY
Status: DISCONTINUED | OUTPATIENT
Start: 2018-04-02 | End: 2018-04-03 | Stop reason: HOSPADM

## 2018-04-02 RX ORDER — MYCOPHENOLATE MOFETIL 250 MG/1
1000 CAPSULE ORAL EVERY 12 HOURS SCHEDULED
Status: DISCONTINUED | OUTPATIENT
Start: 2018-04-02 | End: 2018-04-03 | Stop reason: HOSPADM

## 2018-04-02 RX ORDER — FLUCONAZOLE 100 MG/1
200 TABLET ORAL 2 TIMES DAILY
Status: DISCONTINUED | OUTPATIENT
Start: 2018-04-02 | End: 2018-04-03 | Stop reason: HOSPADM

## 2018-04-02 RX ADMIN — CLONIDINE HYDROCHLORIDE 0.2 MG: 0.1 TABLET ORAL at 08:41

## 2018-04-02 RX ADMIN — SULFAMETHOXAZOLE AND TRIMETHOPRIM 1 TABLET: 800; 160 TABLET ORAL at 21:03

## 2018-04-02 RX ADMIN — HEPARIN SODIUM 5000 UNITS: 5000 INJECTION, SOLUTION INTRAVENOUS; SUBCUTANEOUS at 14:34

## 2018-04-02 RX ADMIN — FLUCONAZOLE 200 MG: 100 TABLET ORAL at 08:40

## 2018-04-02 RX ADMIN — HEPARIN SODIUM 5000 UNITS: 5000 INJECTION, SOLUTION INTRAVENOUS; SUBCUTANEOUS at 06:04

## 2018-04-02 RX ADMIN — MYCOPHENOLATE MOFETIL 1000 MG: 250 CAPSULE ORAL at 11:50

## 2018-04-02 RX ADMIN — ACETAMINOPHEN 650 MG: 325 TABLET, FILM COATED ORAL at 14:37

## 2018-04-02 RX ADMIN — MYCOPHENOLATE MOFETIL 1000 MG: 250 CAPSULE ORAL at 21:07

## 2018-04-02 RX ADMIN — PREDNISONE 10 MG: 10 TABLET ORAL at 08:41

## 2018-04-02 RX ADMIN — FUROSEMIDE 40 MG: 40 TABLET ORAL at 08:40

## 2018-04-02 RX ADMIN — VENLAFAXINE HYDROCHLORIDE 75 MG: 75 CAPSULE, EXTENDED RELEASE ORAL at 08:40

## 2018-04-02 RX ADMIN — FLUCONAZOLE 200 MG: 100 TABLET ORAL at 17:37

## 2018-04-02 RX ADMIN — HEPARIN SODIUM 5000 UNITS: 5000 INJECTION, SOLUTION INTRAVENOUS; SUBCUTANEOUS at 21:02

## 2018-04-02 RX ADMIN — DEXTROAMPHETAMINE SACCHARATE, AMPHETAMINE ASPARTATE, DEXTROAMPHETAMINE SULFATE, AND AMPHETAMINE SULFATE 10 MG: 2.5; 2.5; 2.5; 2.5 TABLET ORAL at 08:40

## 2018-04-02 RX ADMIN — SULFAMETHOXAZOLE AND TRIMETHOPRIM 1 TABLET: 800; 160 TABLET ORAL at 08:41

## 2018-04-02 RX ADMIN — CLONIDINE HYDROCHLORIDE 0.2 MG: 0.1 TABLET ORAL at 21:03

## 2018-04-02 RX ADMIN — ACETAMINOPHEN 650 MG: 325 TABLET, FILM COATED ORAL at 21:03

## 2018-04-02 RX ADMIN — BUPROPION HYDROCHLORIDE 150 MG: 150 TABLET, FILM COATED, EXTENDED RELEASE ORAL at 09:52

## 2018-04-02 RX ADMIN — STANDARDIZED SENNA CONCENTRATE AND DOCUSATE SODIUM 1 TABLET: 8.6; 5 TABLET, FILM COATED ORAL at 21:08

## 2018-04-02 NOTE — PROGRESS NOTES
Progress Note - ICU Transfer to SD/MS tele   Kateryna Hopper 50 y o  female MRN: [id number]  [address] [facility]   Unit/Bed#:  Encounter: [id number]    Code Status: Level 1 - Full Code  POA:    POLST:      Reason for ICU admission: toxic metabolic encephalopathy     Active problems:   Principal Problem:    Opioid dependence with withdrawal ([place] [address] )  Active Problems:    Thyrotoxicosis with thyrotoxic crisis    Altered mental status    Emphysema/COPD ([place] [address] )    ILD (interstitial lung disease) ([place] [address] )    Pulmonary HTN    Acute hypoxemic respiratory failure ([place] [address] )    Adrenal insufficiency ([place] [address] )  Resolved Problems:    Hypertensive crisis      Consultants:N/A    History of Present Illness:     Hilary Melo is 50year old who presented to the ED 3/31/18 via EMS for evaluation of possible drug withdrawal   Patient has a significant past medical history of illicit drug abuse, interstitial lung disease,pulmonary fibrosis, pulmonary HTN, and hypothyroidism  Per documentation prior to arrival patient used heroin and what is believed to be suboxone purchased off of the street  She required multiple doses of benzodiazepines en route to hospital  Upon arrival patient found to be extremely agitated and anxious, to the point where she was uncontrollable  and indirect able despite multiple doses of sedative agent  Patient was subsequently intubated for airway protection  Laboratory workup significant for lactic acid of 9 0, without acute leukocytosis or anion gap, as well as TSH < 0 007  CT head, neck, chest , abdomen and pelvis were obtained and unremarkable  Despite high dose sedation persistently agitated, chewing on endotracheal tube, and dyssynchronous with vent  She received 10 mg of vecuronium, and maintained nimbex  Summary of clinical course:    Patient acute state of altered mental status likely secondary to acute opoid withdrawal as a result of suboxone following heroin use   She was started on clonidine zach sympathetic stabilization of withdrawal symptoms  She was extubated 4/1 without complication  Unfortunately she is currently refusing rehabilitation  We are currently holding home thyroid medications; which will need close follow up  Thyroid U/S is pending  She was restarted on home medications  Patient tolerating normal house diet, she is stable for medical/surg floor  Recent or scheduled procedures:  CTH: No acute intracranial abnormality  CT  C/A/P:Limited exam due to motion  Bibasilar, left lingular infiltrate/atelectasis  No acute intra-abdominal pathology  CT clerical spine: No cervical spine fracture or traumatic malalignment  Outstanding/pending diagnostics:   Thyroid U/S    Cultures:   Blood cultures pending       Mobilization Plan:   Up out of bed with assistance     Nutrition Plan: house diet     Discharge Plan:   Initial /Plan: refusing drug & and alcohol rehab    Home medications that are not reordered and reason why: hyperthyroidism       Spoke with Dr Rupinder Farrell regarding transfer  Please call [phone]  with any questions or concerns  Portions of the record may have been created with voice recognition software  Occasional wrong word or "sound a like" substitutions may have occurred due to the inherent limitations of voice recognition software  Read the chart carefully and recognize, using context, where substitutions have occurred      Lexy Garcia PA-C

## 2018-04-02 NOTE — PLAN OF CARE
DISCHARGE PLANNING     Discharge to home or other facility with appropriate resources Progressing        INFECTION - ADULT     Absence or prevention of progression during hospitalization Progressing        Knowledge Deficit     Patient/family/caregiver demonstrates understanding of disease process, treatment plan, medications, and discharge instructions Progressing        Nutrition/Hydration-ADULT     Nutrient/Hydration intake appropriate for improving, restoring or maintaining nutritional needs Progressing        PAIN - ADULT     Verbalizes/displays adequate comfort level or baseline comfort level Progressing        Potential for Falls     Patient will remain free of falls Progressing        Prexisting or High Potential for Compromised Skin Integrity     Skin integrity is maintained or improved Progressing        SAFETY ADULT     Maintain or return to baseline ADL function Progressing     Maintain or return mobility status to optimal level Progressing        SAFETY,RESTRAINT: NV/NON-SELF DESTRUCTIVE BEHAVIOR     Remains free of harm/injury (restraint for non violent/non self-detsructive behavior) Progressing     Returns to optimal restraint-free functioning Progressing

## 2018-04-02 NOTE — SOCIAL WORK
CM met with pt at bedside  Pt lives with her  Marlee Barrientos in a 2 story house, but she resides mostly on the first floor  There are 9 steps w/railing to reach the upstairs and no TRAVIS  Pt has problems navigating steps due to SOB from pulmonary fibrosis and emphysema  She sees a specialist at [facility] for this  She uses O2-2-4 lpm through [facility]   She quit smoking 10years ago  She quit drinking 20years ago  She uses Wellbutrin for depression from her psychiatrist  She has used [place] with [facility] in the past   She does not have a PCP  CM offered to set up an appoint, but pt refuses  CM supplied Info[employer] Card to assist in finding one  Pt uses [facility] in [address] [place] [address],[address] and has no problem with her co-pays  She does not have a POA or Advanced directive  CM supplied info on both  Pt is disabled and does not work, but she does drive  Her  will transport home when medically cleared  CM discussed discharge needs including rehab  Pt is here due to an accidental overdose to heroin  Pt is refusing-she states that she "is done with that" and that this episode has really scared her  She is also refusing [place] services    CM will await psych recommendations and will ask CMM to assist   CM department will continue to follow through hospitalization

## 2018-04-02 NOTE — PROGRESS NOTES
Progress Note - Critical Care   Ayaka Vann 50 y o  female MRN: [id number]  Unit/Bed#:  Encounter: [id number]    Assessment:   Principal Problem:    Opioid dependence with withdrawal ([facility] )  Active Problems:    Thyrotoxicosis with thyrotoxic crisis    Altered mental status    Emphy[facility]ma/COPD ([facility] )    ILD (interstitial lung disease) ([facility] )    Pulmonary HTN    Acute hypoxemic respiratory failure (HCC)    Adrenal insufficiency (HCC)    Plan:      Neuro:   -patient's original encephalopathy most likely due to acute opioid withdrawal secondary to the use of suboxone directly following heroin use  Her symptoms could have potentially been exacerbated by thyroxicosis secondary to intentional misuse of her thyroid replacement   -her mental status has improved over the past 24 hours allowing for extubation   -consider restarting home dose adderall, bupropion, and effexor   Nursing staff has confirmed the the current home medication list with the patient post extubation  -continue clonidine for opioid withdrawal and initiate weaning, precedex gtt has been discontinued   -unfortunately, the patient is not interested in drug rehabilitation   -[facility] ICU  -sleep hygiene    CV:   -continue to monitor hemodynamics  -HTN has resolved with resolution of acute agitation    Lung:   -patient extubated yesterday and doing well   -early mobilization  -incentive spirometry   -patient has history of fibrotic NSIP and emphysema, she is chronically maintained on cellcept and prednisone   -she takes suppressive fluconazole and bactrim    GI:   -advance diet as tolerated   FEN:   -monitor electrolytes and replete as necessary    :   -with some urinary retention s/p gamez removal overnight, requiring straight cath    ID:   -no infectious etiology at this time    Heme:   -heparin for DVT prophylaxis    Endo:   -US of thyroid pending  -restart armour thyroid this AM    Msk/Skin:   -early mobilization   Disposition:   -transfer to MS/tele SL ______________________________________________________________________    HPI/24hr events: patient extubated yesterday, mental status improving     ______________________________________________________________________    Physical Exam:   PHYSICAL EXAM  General :   Chronically ill appearing female, age appropriate affect, behavior, orientation, thought content, thought processes, judgement, and insight  Articulate  English primary language  Neuro:   GCS= 15  CN 2-12 intact  Non Focal  HEENT:  Normocephalic, atraumatic, Pupils 3 brisk bilat  PERRLA, EOMI, hearing grossly intact  Symmetrical facial expressions without droop or slurred speech  Tongue midline without fasiculations  Neck:  No JVD, FROM, No masses/adenopathy  Back:   Symmetrical, atruamatic, spinous process pain free on palpation  Cardiovascular:   No heaves,lifts,thrills  S1/S2 [email or web address] No noted MRGC  Pulmonary:   Symmetrical expansion of chest  Resp even & unlabored  GI :   Abd SNT + BSX4 quads  No palp/pulsitile masses  :  N/A  Musculoskeletal:  Symmetrical  No obvious deformity  FROM in UE & LE  Muscle strength 5/5  No lymphadenopathy  Extrem warm to touch  DTR grossly intact  Brachial/radial/femoral/popliteal/pedal/posterior tibial pulses +2  No lesions noted  CN 2-12 grossly intact  No rhomberg   Sensation and motor function intact   ______________________________________________________________________  Vitals:    18 1900 18 2000 18 2200 18 2308   BP: 115/63 128/66 116/62 154/71   BP Location: Right arm Right arm Right arm Right arm   Pulse: (!) 50 (!) 52 59 59   Resp: (!) 33 (!) 29 22 22   Temp: 97 9 °F (36 6 °C)   98 5 °F (36 9 °C)   TempSrc: Oral   Oral   SpO2: 100% 99% 99% 99%   Weight:         Temperature:   Temp (24hrs), Av 1 °F (37 3 °C), Min:97 9 °F (36 6 °C), Max:99 9 °F (37 7 °C)    Current Temperature: 98 5 °F (36 9 °C)  Weights:   IBW: -92 5 kg    There is no height or weight on file to calculate BMI  Weight (last 2 days)     Date/Time   Weight    04/01/18 0430  70 4 (155 2)    04/01/18 0000  70 4 (155 2)    03/31/18 2130  70 4 (155 2)    03/31/18 1844  67 8 (149 47)            Hemodynamic Monitoring:  N/A     Non-Invasive/Invasive Ventilation Settings:  Respiratory    Lab Data (Last 4 hours)    None         O2/Vent Data (Last 4 hours)    None              No results found for: PHART, PPV0SMA, PO2ART, VJC2HZE, K1NVCGDR, BEART, SOURCE  SpO2: SpO2: 99 %  Intake and Outputs:  I/O       03/31 0701 - 04/01 0700 04/01 0701 - 04/02 0700    P  O   60    I V  (mL/kg) 641 2 (9 1) 408 7 (5 8)    IV Piggyback 3000     Total Intake(mL/kg) 3641 2 (51 7) 468 7 (6 7)    Urine (mL/kg/hr) 750 375 (0 2)    Emesis/NG output 50 125 (0 1)    Total Output 800 500    Net +2841 2 -31 3              Nutrition:        Diet Orders            Start     Ordered    04/01/18 1549  Diet Clear Liquid  Diet effective now     Question Answer Comment   Diet Type Clear Liquid    RD to adjust diet per protocol?  Yes        04/01/18 1549        Labs:     Results from last 7 days  Lab Units 04/01/18  0501 03/31/18  2130 03/31/18  1646   WBC Thousand/uL 6 07  --  9 05   HEMOGLOBIN g/dL 10 2*  --  12 3   HEMATOCRIT % 33 3*  --  40 1   PLATELETS Thousands/uL 240 263 373   NEUTROS PCT % 83*  --  87*   MONOS PCT % 6  --  2*        Results from last 7 days  Lab Units 04/01/18  0501 03/31/18  2146 03/31/18  1646   SODIUM mmol/L 139 142 140   POTASSIUM mmol/L 4 3 4 5 3 9   CHLORIDE mmol/L 106 109* 101   CO2 mmol/L 24 25 20*   BUN mg/dL 10 9 9   CREATININE mg/dL 0 45* 0 45* 0 88   CALCIUM mg/dL 7 9* 7 1* 8 8   TOTAL PROTEIN g/dL 5 5*  --  6 9   BILIRUBIN TOTAL mg/dL 0 20  --  0 20   ALK PHOS U/L 57  --  76   ALT U/L 24  --  27   AST U/L 22  --  20   GLUCOSE RANDOM mg/dL 116 89 158*       Results from last 7 days  Lab Units 04/01/18  0501 03/31/18  1646   MAGNESIUM mg/dL 2 1 1 6       Results from last 7 days  Lab Units 04/01/18  0501   PHOSPHORUS mg/dL 2 6*        Results from last 7 days  Lab Units 04/01/18  0501 03/31/18  2211 03/31/18  1646   INR  0 99 1 09 0 95   PTT seconds 26 26 27       Results from last 7 days  Lab Units 04/01/18  0501   LACTIC ACID mmol/L 0 7       0  Lab Value Date/Time   TROPONINI <0 02 03/31/2018 1646     Imaging: no new imaging today   EKG: SB-SR   Micro:  No results found for: Kavon Puentes, WOUNDCULT, SPUTUMCULTUR  Allergies: No Known Allergies  Medications:   Scheduled Meds:    Current Facility-Administered Medications:  acetaminophen 650 mg Oral Q6H PRN VICKI Cates   cloNIDine 0 2 mg Oral Q12H [address] Jason Tesoriero, DO   diazepam 5 mg Intravenous Q1H PRN Debby Lawler PA-C   heparin (porcine) 5,000 Units Subcutaneous [facility] [address] Brendan Burks PA-C   predniSONE 50 mg Oral Daily VICKI Winters     Continuous Infusions:   PRN Meds:    acetaminophen 650 mg Q6H PRN   diazepam 5 mg Q1H PRN     VTE Pharmacologic Prophylaxis: Sequential compression device (Venodyne)  and Heparin  VTE Mechanical Prophylaxis: sequential compression device  Invasive lines and devices: Invasive Devices     Peripheral Intravenous Line            Peripheral IV 03/31/18 Left Wrist 1 day    Peripheral IV 03/31/18 Right External Jugular 1 day    Peripheral IV 03/31/18 Right Wrist 1 day    Peripheral IV 04/01/18 Left Wrist less than 1 day    Peripheral IV 04/01/18 Right Forearm less than 1 day              Counseling / Coordination of Care  Total time spent today 34 minutes  Greater than 50% of total time was spent with the patient and / or family counseling and / or coordination of care  Code Status: Level 1 - Full Code    Portions of the record may have been created with voice recognition software  Occasional wrong word or "sound a like" substitutions may have occurred due to the inherent limitations of voice recognition software    Read the chart carefully and recognize, using context, where substitutions have occurred      VICKI Dominguez

## 2018-04-02 NOTE — PLAN OF CARE
Problem: DISCHARGE PLANNING - CARE MANAGEMENT  Goal: Discharge to post-acute care or home with appropriate resources  INTERVENTIONS:  - Conduct assessment to determine patient/family and health care team treatment goals, and need for post-acute services based on payer coverage, community resources, and patient preferences, and barriers to discharge  - Address psychosocial, clinical, and financial barriers to discharge as identified in assessment in conjunction with the patient/family and health care team  - Arrange appropriate level of post-acute services according to patients   needs and preference and payer coverage in collaboration with the physician and health care team  - Communicate with and update the patient/family, physician, and health care team regarding progress on the discharge plan  - Arrange appropriate transportation to post-acute venues  Outcome: Progressing  CM met with pt at bedside  Pt lives with her  Aneesh Pfeiffer in a 2 story house, but she resides mostly on the first floor  There are 9 steps w/railing to reach the upstairs and no TRAVIS  Pt has problems navigating steps due to SOB from pulmonary fibrosis and emphysema  She sees a specialist at [facility] for this  She uses O2-2-4 lpm through [address]   She quit smoking 10years ago  She quit drinking 20years ago  She uses Wellbutrin for depression from her psychiatrist  She has used [place] with [facility] in the past   She does not have a PCP  CM offered to set up an appoint, but pt refuses  CM supplied [employer] Card to assist in finding one  Pt uses [facility] in [facility] and has no problem with her co-pays  She does not have a POA or Advanced directive  CM supplied info on both  Pt is disabled and does not work, but she does drive  Her  will transport home when medically cleared  CM discussed discharge needs including rehab  Pt is here due to an accidental overdose to heroin    Pt is refusing-she states that she "is done with that" and that this episode has really scared her  She is also refusing [facility] services    CM will await psych recommendations and will ask CMM to assist   CM department will continue to follow through hospitalization

## 2018-04-03 VITALS
OXYGEN SATURATION: 87 % | HEIGHT: 64 IN | BODY MASS INDEX: 25.41 KG/M2 | HEART RATE: 79 BPM | WEIGHT: 148.81 LBS | TEMPERATURE: 98.2 F | SYSTOLIC BLOOD PRESSURE: 118 MMHG | DIASTOLIC BLOOD PRESSURE: 69 MMHG | RESPIRATION RATE: 57 BRPM

## 2018-04-03 PROBLEM — R41.82 ALTERED MENTAL STATUS: Status: RESOLVED | Noted: 2018-04-01 | Resolved: 2018-04-03

## 2018-04-03 PROBLEM — E05.91 THYROTOXICOSIS WITH THYROTOXIC CRISIS: Status: RESOLVED | Noted: 2018-04-01 | Resolved: 2018-04-03

## 2018-04-03 PROBLEM — E27.40 ADRENAL INSUFFICIENCY (HCC): Status: RESOLVED | Noted: 2018-04-01 | Resolved: 2018-04-03

## 2018-04-03 PROBLEM — J96.01 ACUTE HYPOXEMIC RESPIRATORY FAILURE (HCC): Status: RESOLVED | Noted: 2018-04-01 | Resolved: 2018-04-03

## 2018-04-03 LAB
ANION GAP SERPL CALCULATED.3IONS-SCNC: 9 MMOL/L (ref 4–13)
ANION GAP SERPL CALCULATED.3IONS-SCNC: 9 MMOL/L (ref 4–13)
BASOPHILS # BLD AUTO: 0.03 THOUSANDS/ΜL (ref 0–0.1)
BASOPHILS NFR BLD AUTO: 1 % (ref 0–1)
BUN SERPL-MCNC: 13 MG/DL (ref 5–25)
BUN SERPL-MCNC: 13 MG/DL (ref 5–25)
CALCIUM SERPL-MCNC: 8.5 MG/DL (ref 8.3–10.1)
CALCIUM SERPL-MCNC: 8.8 MG/DL (ref 8.3–10.1)
CHLORIDE SERPL-SCNC: 105 MMOL/L (ref 100–108)
CHLORIDE SERPL-SCNC: 106 MMOL/L (ref 100–108)
CO2 SERPL-SCNC: 26 MMOL/L (ref 21–32)
CO2 SERPL-SCNC: 28 MMOL/L (ref 21–32)
CREAT SERPL-MCNC: 0.56 MG/DL (ref 0.6–1.3)
CREAT SERPL-MCNC: 0.59 MG/DL (ref 0.6–1.3)
EOSINOPHIL # BLD AUTO: 0.19 THOUSAND/ΜL (ref 0–0.61)
EOSINOPHIL NFR BLD AUTO: 3 % (ref 0–6)
ERYTHROCYTE [DISTWIDTH] IN BLOOD BY AUTOMATED COUNT: 16.1 % (ref 11.6–15.1)
GFR SERPL CREATININE-BSD FRML MDRD: 108 ML/MIN/1.73SQ M
GFR SERPL CREATININE-BSD FRML MDRD: 110 ML/MIN/1.73SQ M
GLUCOSE SERPL-MCNC: 127 MG/DL (ref 65–140)
GLUCOSE SERPL-MCNC: 94 MG/DL (ref 65–140)
HBV CORE AB SER QL: NORMAL
HBV CORE IGM SER QL: NORMAL
HBV SURFACE AG SER QL: NORMAL
HCT VFR BLD AUTO: 38.7 % (ref 34.8–46.1)
HCV AB SER QL: NORMAL
HGB BLD-MCNC: 12.2 G/DL (ref 11.5–15.4)
INR PPP: 0.94 (ref 0.86–1.16)
LYMPHOCYTES # BLD AUTO: 2.28 THOUSANDS/ΜL (ref 0.6–4.47)
LYMPHOCYTES NFR BLD AUTO: 37 % (ref 14–44)
MAGNESIUM SERPL-MCNC: 1.6 MG/DL (ref 1.6–2.6)
MCH RBC QN AUTO: 28 PG (ref 26.8–34.3)
MCHC RBC AUTO-ENTMCNC: 31.5 G/DL (ref 31.4–37.4)
MCV RBC AUTO: 89 FL (ref 82–98)
MONOCYTES # BLD AUTO: 0.57 THOUSAND/ΜL (ref 0.17–1.22)
MONOCYTES NFR BLD AUTO: 9 % (ref 4–12)
MYOGLOBIN UR-MCNC: 7 NG/ML (ref 0–13)
NEUTROPHILS # BLD AUTO: 3.11 THOUSANDS/ΜL (ref 1.85–7.62)
NEUTS SEG NFR BLD AUTO: 50 % (ref 43–75)
NRBC BLD AUTO-RTO: 0 /100 WBCS
PLATELET # BLD AUTO: 251 THOUSANDS/UL (ref 149–390)
PMV BLD AUTO: 9.7 FL (ref 8.9–12.7)
POTASSIUM SERPL-SCNC: 3.2 MMOL/L (ref 3.5–5.3)
POTASSIUM SERPL-SCNC: 3.3 MMOL/L (ref 3.5–5.3)
PROTHROMBIN TIME: 12.8 SECONDS (ref 12.1–14.4)
RBC # BLD AUTO: 4.35 MILLION/UL (ref 3.81–5.12)
SODIUM SERPL-SCNC: 140 MMOL/L (ref 136–145)
SODIUM SERPL-SCNC: 143 MMOL/L (ref 136–145)
WBC # BLD AUTO: 6.2 THOUSAND/UL (ref 4.31–10.16)

## 2018-04-03 PROCEDURE — 80048 BASIC METABOLIC PNL TOTAL CA: CPT | Performed by: PHYSICIAN ASSISTANT

## 2018-04-03 PROCEDURE — 83735 ASSAY OF MAGNESIUM: CPT | Performed by: GENERAL PRACTICE

## 2018-04-03 PROCEDURE — 85610 PROTHROMBIN TIME: CPT | Performed by: PHYSICIAN ASSISTANT

## 2018-04-03 PROCEDURE — 99239 HOSP IP/OBS DSCHRG MGMT >30: CPT | Performed by: GENERAL PRACTICE

## 2018-04-03 PROCEDURE — 99223 1ST HOSP IP/OBS HIGH 75: CPT | Performed by: PSYCHIATRY & NEUROLOGY

## 2018-04-03 PROCEDURE — 85025 COMPLETE CBC W/AUTO DIFF WBC: CPT | Performed by: PHYSICIAN ASSISTANT

## 2018-04-03 PROCEDURE — 80048 BASIC METABOLIC PNL TOTAL CA: CPT | Performed by: GENERAL PRACTICE

## 2018-04-03 RX ORDER — POTASSIUM CHLORIDE 20 MEQ/1
40 TABLET, EXTENDED RELEASE ORAL ONCE
Status: COMPLETED | OUTPATIENT
Start: 2018-04-03 | End: 2018-04-03

## 2018-04-03 RX ORDER — LEVOTHYROXINE AND LIOTHYRONINE 76; 18 UG/1; UG/1
120 TABLET ORAL DAILY
Qty: 15 TABLET | Refills: 0 | Status: ON HOLD | OUTPATIENT
Start: 2018-04-03 | End: 2018-04-30

## 2018-04-03 RX ORDER — CLONIDINE HYDROCHLORIDE 0.2 MG/1
0.2 TABLET ORAL EVERY 12 HOURS SCHEDULED
Qty: 30 TABLET | Refills: 0 | Status: SHIPPED | OUTPATIENT
Start: 2018-04-03

## 2018-04-03 RX ORDER — LAMOTRIGINE 25 MG/1
25 TABLET ORAL DAILY
Qty: 14 TABLET | Refills: 0 | Status: SHIPPED | OUTPATIENT
Start: 2018-04-03

## 2018-04-03 RX ADMIN — SULFAMETHOXAZOLE AND TRIMETHOPRIM 1 TABLET: 800; 160 TABLET ORAL at 08:19

## 2018-04-03 RX ADMIN — ACETAMINOPHEN 650 MG: 325 TABLET, FILM COATED ORAL at 05:17

## 2018-04-03 RX ADMIN — POTASSIUM CHLORIDE 40 MEQ: 1500 TABLET, EXTENDED RELEASE ORAL at 14:19

## 2018-04-03 RX ADMIN — CLONIDINE HYDROCHLORIDE 0.2 MG: 0.1 TABLET ORAL at 08:58

## 2018-04-03 RX ADMIN — VENLAFAXINE HYDROCHLORIDE 75 MG: 75 CAPSULE, EXTENDED RELEASE ORAL at 08:17

## 2018-04-03 RX ADMIN — HEPARIN SODIUM 5000 UNITS: 5000 INJECTION, SOLUTION INTRAVENOUS; SUBCUTANEOUS at 14:13

## 2018-04-03 RX ADMIN — HEPARIN SODIUM 5000 UNITS: 5000 INJECTION, SOLUTION INTRAVENOUS; SUBCUTANEOUS at 05:17

## 2018-04-03 RX ADMIN — FUROSEMIDE 40 MG: 40 TABLET ORAL at 08:58

## 2018-04-03 RX ADMIN — BUPROPION HYDROCHLORIDE 150 MG: 150 TABLET, FILM COATED, EXTENDED RELEASE ORAL at 08:26

## 2018-04-03 RX ADMIN — PREDNISONE 10 MG: 10 TABLET ORAL at 08:18

## 2018-04-03 RX ADMIN — MYCOPHENOLATE MOFETIL 1000 MG: 250 CAPSULE ORAL at 08:58

## 2018-04-03 RX ADMIN — DEXTROAMPHETAMINE SACCHARATE, AMPHETAMINE ASPARTATE, DEXTROAMPHETAMINE SULFATE, AND AMPHETAMINE SULFATE 10 MG: 2.5; 2.5; 2.5; 2.5 TABLET ORAL at 08:18

## 2018-04-03 RX ADMIN — POTASSIUM CHLORIDE 40 MEQ: 1500 TABLET, EXTENDED RELEASE ORAL at 06:27

## 2018-04-03 RX ADMIN — FLUCONAZOLE 200 MG: 100 TABLET ORAL at 08:19

## 2018-04-03 NOTE — PLAN OF CARE
DISCHARGE PLANNING     Discharge to home or other facility with appropriate resources Adequate for Discharge        INFECTION - ADULT     Absence or prevention of progression during hospitalization Adequate for Discharge        Knowledge Deficit     Patient/family/caregiver demonstrates understanding of disease process, treatment plan, medications, and discharge instructions Adequate for Discharge        Nutrition/Hydration-ADULT     Nutrient/Hydration intake appropriate for improving, restoring or maintaining nutritional needs Adequate for Discharge        PAIN - ADULT     Verbalizes/displays adequate comfort level or baseline comfort level Adequate for Discharge        Potential for Falls     Patient will remain free of falls Adequate for Discharge        Prexisting or High Potential for Compromised Skin Integrity     Skin integrity is maintained or improved Adequate for Discharge        SAFETY ADULT     Maintain or return to baseline ADL function Adequate for Discharge     Maintain or return mobility status to optimal level Adequate for Discharge

## 2018-04-03 NOTE — PLAN OF CARE
Problem: DISCHARGE PLANNING - CARE MANAGEMENT  Goal: Discharge to post-acute care or home with appropriate resources  INTERVENTIONS:  - Conduct assessment to determine patient/family and health care team treatment goals, and need for post-acute services based on payer coverage, community resources, and patient preferences, and barriers to discharge  - Address psychosocial, clinical, and financial barriers to discharge as identified in assessment in conjunction with the patient/family and health care team  - Arrange appropriate level of post-acute services according to patient's   needs and preference and payer coverage in collaboration with the physician and health care team  - Communicate with and update the patient/family, physician, and health care team regarding progress on the discharge plan  - Arrange appropriate transportation to post-acute venues   Outcome: Completed Date Met: 04/03/18  CM met with pt at bedside  Pt to be discharged today with  [facility] transporting  Pt continues to refuse in patient rehab, but did accept info on O/P Substance Abuse, [facility]-Self Help Support Groups, and [place] OP/Substance Abuse Providers  IMM reviewed and signed  Copy to pt and copy to MR for scanning
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
## 2018-04-03 NOTE — SOCIAL WORK
CM met with pt at bedside  Pt to be discharged today with  Maco sharp  Pt continues to refuse in patient rehab, but did accept info on O/P Substance Abuse, [facility]-Self Help Support Groups, and [place] OP/Substance Abuse Providers  IMM reviewed and signed  Copy to pt and copy to MR for scanning

## 2018-04-03 NOTE — DISCHARGE SUMMARY
Discharge Summary - [facility]    Patient Information: Rodolfo Frazier 50 y o  female MRN: [id number]  Unit/Bed#:  Encounter: [id number]    Discharging Physician / Practitioner: Shaila Greenberg MD  PCP: No primary care provider on file  Admission Date: 3/31/2018  Discharge Date: 04/03/18    Reason for Admission:   Serotonin Syndrome  - Thyrotoxicosis  - Opiate Withdrawal  - Hypertensive Crisis  - Altered Mental Status    Ddx: Synthetic Drug self administration               Thyroid Storm                Psychiatric Etiology        Plan:                  Neuro:  Continue to monitor neurologic status  Patient is currently sedated on propofol and Precedex  Maintain BIZ between 20-40  Patient currently on Nimbex drip, maintain train of 4 at 3/4  Patient mental status change prior to intubation:   Will require psychiatric evaluation after extubation  No ocular or plantar clonus present                 CV:  Hypertensive crisis:  Systolic blood pressure did come down after administration of beta-blocker and adequate sedation  Continue to monitor cardiac status and vital signs  Utilize beta blockers for hypertension SBP >180 mmHg                 Lung:  Patient intubated for airway protection, continue mechanical ventilation, goal to wean and extubate ASAP  Provide for tracheal toilet, bronchodilators as needed                 GI:  Pepcid for PUD prophylaxis                 FEN:  Monitor and replete electrolytes as indicated  Keep NPO for now                 :   Indwelling Luna catheter inserted  Monitor I&O hourly  Monitor renal indices                 ID:  No indication for antibiotic therapy at this time                 Heme:  Continue to monitor hemoglobin hematocrit and platelet count  Workup for DIC in progress                 Endo:  Thyrotoxicosis versus serotonin syndrome  TSH <0 007, free T4 1 38, free T3 7 38 -----> R/O T3 Toxicosis / start propranolol and Solu-Cortef  LD 360  CK 70                 Msk/Skin:  Reposition q 2 hours                 Disposition:  Remain in ICU at this time    Discharge Diagnoses:     Principal Problem:    Opioid dependence with withdrawal ([facility] )  Active Problems:    Emphysema/COPD ([facility] )    ILD (interstitial lung disease) ([facility] )    Pulmonary HTN  Resolved Problems:    Hypertensive crisis    Thyrotoxicosis with thyrotoxic crisis    Altered mental status    Acute hypoxemic respiratory failure ([facility] )    Adrenal insufficiency ([place] [id number] )      Consultations During Hospital Stay:  · psychaitry  · Case d/w endocrinology for thyrotoxicosis-recommended discontinue cytomel  · For adrenal insufficiency recommends continue current dose prednisone 10mg po daily    Procedures Performed:     · intubation    Significant Findings:     ·     Incidental Findings:   ·      Test Results Pending at Discharge (will require follow up):   ·      Outpatient Tests Requested:  ·     Complications:     Hospital Course:     Nikos Luke is a 50 y o  female patient who originally presented to the hospital on 3/31/2018 due to overdose ?etiology on admission  Encephalopathy most likely secondary to opioid withdrawal with concurrent use of Suboxone  Exacerbations his symptomatology may have been exacerbated by intentional misuse of her thyroid replacement therapy generating thyrotoxicosis  Continue monitor mental status and CAM ICU  Continue proper sleep hygiene  This patient may require involuntary admission into mental health facility  CV:  Continue monitor hemodynamics  Hypertensive crisis has resolved     Pulm:  Patient extubated 2 days ago  Encourage use of incentive spirometry, at patient out of bed and ambulating  Bronchodilators as needed     GI:  Advanced diet as tolerated  PPI for PUD prophylaxis     : Monitor I&O, straight cath if indicated     F/E/N:  Monitor replete electrolytes as indicated, advanced diet as tolerated     ID:   Antibiotics not indicated at this time     Heme:  Continue monitor hemoglobin hematocrit     Endo:  Monitor blood glucose and thyroid studies                Msk/Skin:  Encourage patient out of bed/ambulating       Condition at Discharge: stable     Discharge Day Visit / Exam:     * Please refer to separate progress for these details *    Discharge instructions/Information to patient and family:   See after visit summary for information provided to patient and family  Provisions for Follow-Up Care:  See after visit summary for information related to follow-up care and any pertinent home health orders  Disposition:     Home    For Discharges to [facility] SNF:   · Not Applicable to this Patient - Not Applicable to this Patient    Planned Readmission:     Discharge Statement:  I spent 40 minutes discharging the patient  This time was spent on the day of discharge  I had direct contact with the patient on the day of discharge  Greater than 50% of the total time was spent examining patient, answering all patient questions, arranging and discussing plan of care with patient as well as directly providing post-discharge instructions  Additional time then spent on discharge activities  Discharge Medications:  See after visit summary for reconciled discharge medications provided to patient and family  ** Please Note: Dragon 360 Dictation voice to text software may have been used in the creation of this document   **

## 2018-04-03 NOTE — PROGRESS NOTES
Progress Note - Critical Care   Rudy Jenkins 50 y o  female MRN: [id number]  Unit/Bed#:  Encounter: [id number]    Attending Physician: Sky Suazo MD      ______________________________________________________________________  Assessment and Plan:   Principal Problem:    Opioid dependence with withdrawal [facility])  Active Problems:    Thyrotoxicosis with thyrotoxic crisis    Altered mental status    Emphysema/COPD ([facility] )    ILD (interstitial lung disease) ([facility] )    Pulmonary HTN    Acute hypoxemic respiratory failure (HCC)    Adrenal insufficiency ([facility] )  Resolved Problems:    Hypertensive crisis        Neuro:  Mental status improved from admission  Encephalopathy most likely secondary to opioid withdrawal with concurrent use of Suboxone  Exacerbations his symptomatology may have been exacerbated by intentional misuse of her thyroid replacement therapy generating thyrotoxicosis  Continue monitor mental status and CAM ICU  Continue proper sleep hygiene  This patient may require involuntary admission into mental health facility  CV:  Continue monitor hemodynamics  Hypertensive crisis has resolved    Pulm:  Patient extubated 2 days ago  Encourage use of incentive spirometry, at patient out of bed and ambulating  Bronchodilators as needed    GI:  Advanced diet as tolerated  PPI for PUD prophylaxis    : Monitor I&O, straight cath if indicated    F/E/N:  Monitor replete electrolytes as indicated, advanced diet as tolerated    ID:  Antibiotics not indicated at this time    Heme:  Continue monitor hemoglobin hematocrit    Endo:  Monitor blood glucose and thyroid studies     Msk/Skin:  Encourage patient out of bed/ambulating    Disposition:  May be transferred on the unit    Code Status: Level 1 - Full Code    Counseling / Coordination of Care  Total Critical Care time spent 35 minutes excluding procedures, teaching and family updates  ______________________________________________________________________    Chief Complaint:  Offering no complaints    24 Hour Events:  Has remained extubated, no dyspnea noted      ______________________________________________________________________    Physical Exam:   Constitutional:  In no acute distress  HEENT:  Normocephalic, atraumatic, pupils equal and reactive, sclera anicteric, EOM intact, airway patent, trachea midline without tugging, negative JVD, neck supple  Pulm:  Lungs equal with fine scattered rhonchi  CV: HRRR, S1-S2 no gallops murmurs or rubs  ABD:  Soft/nontender, no palpable masses  :  Deferred  M/S:  Moves all extremities well  Skin:  Warm/dry/intact  Neuro:  No focal deficits noted  Mental Status:  Affect anxious    ______________________________________________________________________  Vitals:    18 1500 18 1938 18 2103 189   BP:  106/61 111/60 102/52   BP Location:  Right arm  Right arm   Pulse: 69 67  61   Resp: (!) 27 (!) 28  20   Temp:  98 °F (36 7 °C)  98 4 °F (36 9 °C)   TempSrc:  Oral  Oral   SpO2:  99%  99%   Weight:       Height:                  Temperature:   Temp (24hrs), Av 3 °F (36 8 °C), Min:98 °F (36 7 °C), Max:98 8 °F (37 1 °C)    Current Temperature: 98 4 °F (36 9 °C)  Weights:   IBW: -92 5 kg    Body mass index is 25 66 kg/m²  Weight (last 2 days)     Date/Time   Weight    18 0600  67 8 (149 47)    18 0430  70 4 (155 2)    18 0000  70 4 (155 2)            Hemodynamic Monitoring:  N/A     Non-Invasive/Invasive Ventilation Settings:  Respiratory    Lab Data (Last 4 hours)    None         O2/Vent Data (Last 4 hours)    None              No results found for: PHART, MDZ5UWS, PO2ART, ZEJ0PSH, N4QVANKM, BEART, SOURCE  SpO2: SpO2: 99 %  Intake and Outputs:  I/O        07 -  0700  07 -  0700    P  O  60 100    I V  (mL/kg) 408 7 (6)     Total Intake(mL/kg) 468 7 (6 9) 100 (1 5)    Urine (mL/kg/hr) 825 (0 5) 2825 (1 7)    Emesis/NG output 125 (0 1)     Stool  0 (0)    Total Output 950 2825    Net -481 3 -2725          Unmeasured Urine Occurrence  1 x    Unmeasured Stool Occurrence  1 x        UOP: qs/hour   Nutrition:        Diet Orders            Start     Ordered    04/02/18 0624  Diet Regular; Regular House  Diet effective now     Question Answer Comment   Diet Type Regular    Regular Regular House    RD to adjust diet per protocol?  Yes        04/02/18 0624          Labs:     Results from last 7 days  Lab Units 04/02/18  0433 04/01/18  0501 03/31/18  2130 03/31/18  1646   WBC Thousand/uL 7 32 6 07  --  9 05   HEMOGLOBIN g/dL 9 9* 10 2*  --  12 3   HEMATOCRIT % 31 5* 33 3*  --  40 1   PLATELETS Thousands/uL 226 240 263 373   NEUTROS PCT % 71 83*  --  87*   MONOS PCT % 9 6  --  2*       Results from last 7 days  Lab Units 04/02/18  0433 04/01/18  0501 03/31/18  2146 03/31/18  1646   SODIUM mmol/L 138 139 142 140   POTASSIUM mmol/L 4 1 4 3 4 5 3 9   CHLORIDE mmol/L 105 106 109* 101   CO2 mmol/L 26 24 25 20*   BUN mg/dL 13 10 9 9   CREATININE mg/dL 0 41* 0 45* 0 45* 0 88   CALCIUM mg/dL 8 0* 7 9* 7 1* 8 8   TOTAL PROTEIN g/dL 5 2* 5 5*  --  6 9   BILIRUBIN TOTAL mg/dL 0 30 0 20  --  0 20   ALK PHOS U/L 52 57  --  76   ALT U/L 23 24  --  27   AST U/L 18 22  --  20   GLUCOSE RANDOM mg/dL 85 116 89 158*       Results from last 7 days  Lab Units 04/02/18  0433 04/01/18  0501 03/31/18  1646   MAGNESIUM mg/dL 2 0 2 1 1 6     Lab Results   Component Value Date    PHOS 3 7 04/02/2018    PHOS 2 6 (L) 04/01/2018        Results from last 7 days  Lab Units 04/02/18  0433 04/01/18  0501 03/31/18  2211   INR  1 09 0 99 1 09   PTT seconds 29 26 26       0  Lab Value Date/Time   TROPONINI <0 02 03/31/2018 1646       Results from last 7 days  Lab Units 04/01/18  0501 04/01/18  0333 04/01/18  0006   LACTIC ACID mmol/L 0 7 0 7 0 8     ABG:  Lab Results   Component Value Date    PHART 7 424 03/31/2018    [id number] 40 0 03/31/2018 PO2ART 142 7 (H) 03/31/2018    MTJ1HAZ 25 6 03/31/2018    BEART 1 1 03/31/2018    SOURCE Radial, Left 03/31/2018     Imaging:  I have personally reviewed pertinent reports  EKG: NSR  Micro:  Lab Results   Component Value Date    BLOODCX No Growth at 24 hrs  03/31/2018    BLOODCX No Growth at 24 hrs  03/31/2018     Allergies: No Known Allergies  Medications:   Scheduled Meds:  Current Facility-Administered Medications:  acetaminophen 650 mg Oral Q6H PRN VICKI Choi   amphetamine-dextroamphetamine 10 mg Oral BID (AM & Afternoon) CHU Burnett-NOEMI   buPROPion 150 mg Oral Daily [facility] Agustín, PA-C   cloNIDine 0 2 mg Oral Q12H [facility] Jason Hernandez DO   fluconazole 200 mg Oral BID Morena Agustín, PA-C   furosemide 40 mg Oral Daily [facility] Agustín, PA-C   heparin (porcine) 5,000 Units Subcutaneous [facility] Henrene Cord Sekunda, PA-C   mycophenolate 1,000 mg Oral Q12H [facility] Agustín, PA-C   predniSONE 10 mg Oral Daily [facility] Agustín PA-C   senna-docusate sodium 1 tablet Oral HS [facility] Agustín, PA-C   sulfamethoxazole-trimethoprim 1 tablet Oral Q12H [facility], PA-C   venlafaxine 75 mg Oral Daily Morena Agustín, PA-C     Continuous Infusions:   PRN Meds:    acetaminophen 650 mg Q6H PRN     VTE Pharmacologic Prophylaxis: Sequential compression device (Venodyne)   VTE Mechanical Prophylaxis: sequential compression device  Invasive lines and devices: Invasive Devices     Peripheral Intravenous Line            Peripheral IV 03/31/18 Left Wrist 2 days    Peripheral IV 03/31/18 Right External Jugular 2 days    Peripheral IV 04/01/18 Right Forearm 1 day                     Portions of the record may have been created with voice recognition software  Occasional wrong word or "sound a like" substitutions may have occurred due to the inherent limitations of voice recognition software  Read the chart carefully and recognize, using context, where substitutions have occurred      Enriqueta Brumfield PA-C

## 2018-04-03 NOTE — PLAN OF CARE
SAFETY,RESTRAINT: NV/NON-SELF DESTRUCTIVE BEHAVIOR     Remains free of harm/injury (restraint for non violent/non self-detsructive behavior) Completed     Returns to optimal restraint-free functioning Completed          DISCHARGE PLANNING     Discharge to home or other facility with appropriate resources Progressing        DISCHARGE PLANNING - CARE MANAGEMENT     Discharge to post-acute care or home with appropriate resources Progressing        INFECTION - ADULT     Absence or prevention of progression during hospitalization Progressing        Knowledge Deficit     Patient/family/caregiver demonstrates understanding of disease process, treatment plan, medications, and discharge instructions Progressing        Nutrition/Hydration-ADULT     Nutrient/Hydration intake appropriate for improving, restoring or maintaining nutritional needs Progressing        PAIN - ADULT     Verbalizes/displays adequate comfort level or baseline comfort level Progressing        Potential for Falls     Patient will remain free of falls Progressing        Prexisting or High Potential for Compromised Skin Integrity     Skin integrity is maintained or improved Progressing        SAFETY ADULT     Maintain or return to baseline ADL function Progressing     Maintain or return mobility status to optimal level Progressing

## 2018-04-04 LAB — THYROGLOB AB SERPL-ACNC: <1 IU/ML (ref 0–0.9)

## 2018-04-06 LAB
BACTERIA BLD CULT: NORMAL
BACTERIA BLD CULT: NORMAL

## 2018-04-28 ENCOUNTER — HOSPITAL ENCOUNTER (INPATIENT)
Facility: HOSPITAL | Age: 48
LOS: 2 days | Discharge: HOME | DRG: 897 | End: 2018-04-30
Attending: EMERGENCY MEDICINE | Admitting: INTERNAL MEDICINE
Payer: MEDICARE

## 2018-04-28 ENCOUNTER — HOSPITAL ENCOUNTER (EMERGENCY)
Facility: HOSPITAL | Age: 48
Discharge: HOME | End: 2018-04-28

## 2018-04-28 DIAGNOSIS — R79.89 LOW SERUM THYROID STIMULATING HORMONE (TSH): ICD-10-CM

## 2018-04-28 DIAGNOSIS — F11.23 HEROIN WITHDRAWAL (HCC): Primary | ICD-10-CM

## 2018-04-28 DIAGNOSIS — E05.90 THYROTOXICOSIS: ICD-10-CM

## 2018-04-28 PROBLEM — I27.20 PULMONARY HTN (HCC): Chronic | Status: ACTIVE | Noted: 2018-04-01

## 2018-04-28 LAB
ALBUMIN SERPL BCP-MCNC: 3.7 G/DL (ref 3.5–5)
ALP SERPL-CCNC: 62 U/L (ref 46–116)
ALT SERPL W P-5'-P-CCNC: 31 U/L (ref 12–78)
ANION GAP SERPL CALCULATED.3IONS-SCNC: 17 MMOL/L (ref 4–13)
AST SERPL W P-5'-P-CCNC: 19 U/L (ref 5–45)
BASOPHILS # BLD AUTO: 0.01 THOUSANDS/ΜL (ref 0–0.1)
BASOPHILS NFR BLD AUTO: 0 % (ref 0–1)
BILIRUB DIRECT SERPL-MCNC: 0.07 MG/DL (ref 0–0.2)
BILIRUB SERPL-MCNC: 0.3 MG/DL (ref 0.2–1)
BUN SERPL-MCNC: 15 MG/DL (ref 5–25)
CALCIUM SERPL-MCNC: 9.5 MG/DL (ref 8.3–10.1)
CHLORIDE SERPL-SCNC: 99 MMOL/L (ref 100–108)
CK SERPL-CCNC: 68 U/L (ref 26–192)
CO2 SERPL-SCNC: 21 MMOL/L (ref 21–32)
CREAT SERPL-MCNC: 0.99 MG/DL (ref 0.6–1.3)
EOSINOPHIL # BLD AUTO: 0.01 THOUSAND/ΜL (ref 0–0.61)
EOSINOPHIL NFR BLD AUTO: 0 % (ref 0–6)
ERYTHROCYTE [DISTWIDTH] IN BLOOD BY AUTOMATED COUNT: 15.3 % (ref 11.6–15.1)
GFR SERPL CREATININE-BSD FRML MDRD: 67 ML/MIN/1.73SQ M
GLUCOSE SERPL-MCNC: 118 MG/DL (ref 65–140)
HCT VFR BLD AUTO: 42.6 % (ref 34.8–46.1)
HGB BLD-MCNC: 13.4 G/DL (ref 11.5–15.4)
LACTATE SERPL-SCNC: 1.2 MMOL/L (ref 0.5–2)
LYMPHOCYTES # BLD AUTO: 0.81 THOUSANDS/ΜL (ref 0.6–4.47)
LYMPHOCYTES NFR BLD AUTO: 11 % (ref 14–44)
MAGNESIUM SERPL-MCNC: 1.4 MG/DL (ref 1.6–2.6)
MCH RBC QN AUTO: 27.6 PG (ref 26.8–34.3)
MCHC RBC AUTO-ENTMCNC: 31.5 G/DL (ref 31.4–37.4)
MCV RBC AUTO: 88 FL (ref 82–98)
MONOCYTES # BLD AUTO: 0.26 THOUSAND/ΜL (ref 0.17–1.22)
MONOCYTES NFR BLD AUTO: 4 % (ref 4–12)
NEUTROPHILS # BLD AUTO: 6.12 THOUSANDS/ΜL (ref 1.85–7.62)
NEUTS SEG NFR BLD AUTO: 85 % (ref 43–75)
NRBC BLD AUTO-RTO: 0 /100 WBCS
PLATELET # BLD AUTO: 327 THOUSANDS/UL (ref 149–390)
PMV BLD AUTO: 10.6 FL (ref 8.9–12.7)
POTASSIUM SERPL-SCNC: 4 MMOL/L (ref 3.5–5.3)
PROT SERPL-MCNC: 7.2 G/DL (ref 6.4–8.2)
RBC # BLD AUTO: 4.85 MILLION/UL (ref 3.81–5.12)
SODIUM SERPL-SCNC: 137 MMOL/L (ref 136–145)
TROPONIN I SERPL-MCNC: <0.02 NG/ML
TSH SERPL DL<=0.05 MIU/L-ACNC: <0.007 UIU/ML (ref 0.36–3.74)
WBC # BLD AUTO: 7.24 THOUSAND/UL (ref 4.31–10.16)

## 2018-04-28 PROCEDURE — 83605 ASSAY OF LACTIC ACID: CPT | Performed by: EMERGENCY MEDICINE

## 2018-04-28 PROCEDURE — 85025 COMPLETE CBC W/AUTO DIFF WBC: CPT | Performed by: EMERGENCY MEDICINE

## 2018-04-28 PROCEDURE — 96361 HYDRATE IV INFUSION ADD-ON: CPT

## 2018-04-28 PROCEDURE — 96376 TX/PRO/DX INJ SAME DRUG ADON: CPT

## 2018-04-28 PROCEDURE — 96365 THER/PROPH/DIAG IV INF INIT: CPT

## 2018-04-28 PROCEDURE — 84484 ASSAY OF TROPONIN QUANT: CPT | Performed by: EMERGENCY MEDICINE

## 2018-04-28 PROCEDURE — 36415 COLL VENOUS BLD VENIPUNCTURE: CPT | Performed by: EMERGENCY MEDICINE

## 2018-04-28 PROCEDURE — 80076 HEPATIC FUNCTION PANEL: CPT | Performed by: EMERGENCY MEDICINE

## 2018-04-28 PROCEDURE — 80048 BASIC METABOLIC PNL TOTAL CA: CPT | Performed by: EMERGENCY MEDICINE

## 2018-04-28 PROCEDURE — 84443 ASSAY THYROID STIM HORMONE: CPT | Performed by: EMERGENCY MEDICINE

## 2018-04-28 PROCEDURE — 96375 TX/PRO/DX INJ NEW DRUG ADDON: CPT

## 2018-04-28 PROCEDURE — 82550 ASSAY OF CK (CPK): CPT | Performed by: EMERGENCY MEDICINE

## 2018-04-28 PROCEDURE — 80307 DRUG TEST PRSMV CHEM ANLYZR: CPT | Performed by: EMERGENCY MEDICINE

## 2018-04-28 PROCEDURE — 99223 1ST HOSP IP/OBS HIGH 75: CPT | Performed by: PHYSICIAN ASSISTANT

## 2018-04-28 PROCEDURE — 83735 ASSAY OF MAGNESIUM: CPT | Performed by: EMERGENCY MEDICINE

## 2018-04-28 PROCEDURE — 81025 URINE PREGNANCY TEST: CPT | Performed by: EMERGENCY MEDICINE

## 2018-04-28 PROCEDURE — 93005 ELECTROCARDIOGRAM TRACING: CPT

## 2018-04-28 RX ORDER — MAGNESIUM SULFATE HEPTAHYDRATE 40 MG/ML
2 INJECTION, SOLUTION INTRAVENOUS ONCE
Status: COMPLETED | OUTPATIENT
Start: 2018-04-28 | End: 2018-04-28

## 2018-04-28 RX ORDER — LORAZEPAM 1 MG/1
1 TABLET ORAL ONCE
Status: DISCONTINUED | OUTPATIENT
Start: 2018-04-28 | End: 2018-04-28

## 2018-04-28 RX ORDER — LORAZEPAM 2 MG/ML
1 INJECTION INTRAMUSCULAR ONCE
Status: COMPLETED | OUTPATIENT
Start: 2018-04-28 | End: 2018-04-28

## 2018-04-28 RX ORDER — CLONIDINE HYDROCHLORIDE 0.1 MG/1
0.2 TABLET ORAL EVERY 12 HOURS SCHEDULED
Status: DISCONTINUED | OUTPATIENT
Start: 2018-04-28 | End: 2018-04-30 | Stop reason: HOSPADM

## 2018-04-28 RX ADMIN — MAGNESIUM SULFATE HEPTAHYDRATE 2 G: 40 INJECTION, SOLUTION INTRAVENOUS at 21:15

## 2018-04-28 RX ADMIN — SODIUM CHLORIDE 1000 ML: 0.9 INJECTION, SOLUTION INTRAVENOUS at 20:09

## 2018-04-28 RX ADMIN — LORAZEPAM 1 MG: 2 INJECTION INTRAMUSCULAR; INTRAVENOUS at 20:08

## 2018-04-28 RX ADMIN — LORAZEPAM 1 MG: 2 INJECTION INTRAMUSCULAR; INTRAVENOUS at 21:15

## 2018-04-28 RX ADMIN — CLONIDINE HYDROCHLORIDE 0.2 MG: 0.1 TABLET ORAL at 20:11

## 2018-04-28 NOTE — ED PROVIDER NOTES
History  Chief Complaint   Patient presents with    Delirium Tremens (DTS)     heroin withdrawl x 2 days  ems gave  5 mg versed enroute with no effect  pt rocking back and fourth asking for suboxone  states she was here last month for same problem   states not to believe patient     This 66-year-old female presented for evaluation of diffuse muscular spasm and confusion  The patient is a chronic opioid abuser  She states that she uses proximally 10 bags of heroin per day and has been doing so for many years  She states that she has not had any heroin for the past 2 to 3 days  Starting today she has had uncontrollable spasms in her upper and lower extremities  The patient apparently had a similar presentation to our emergency department about one month ago  At that time she was so combative and confused that she required intubation and sedation  At that time she was found to have thyrotoxicosis, which was thought to be secondary to overdose on her thyroid supplements  The patient states that she was instructed to discontinue or Synthroid and reduce the thyroid armor pill that she was taking twice a day to once a day  She denies taking any additional thyroid supplements  She does note that she took 1/2 a tablet of Suboxone earlier today  She denies fevers  On presentation the patient is able to answer simple questions and follow simple commands but is unable to provide a detailed history of present illness  She was given 5 milligrams of Versed pre-hospital with minimal relief of her symptoms  History provided by:  Patient   used: No        Prior to Admission Medications   Prescriptions Last Dose Informant Patient Reported? Taking?    amphetamine-dextroamphetamine (ADDERALL) 20 mg tablet   Yes No   Sig: Take 10 mg by mouth 2 (two) times a day   buPROPion (WELLBUTRIN SR) 150 mg 12 hr tablet   Yes No   Sig: Take 150 mg by mouth daily   cloNIDine (CATAPRES) 0 2 mg tablet   No No   Sig: Take 1 tablet (0 2 mg total) by mouth every 12 (twelve) hours   fluconazole (DIFLUCAN) 200 mg tablet   Yes No   Sig: Take 200 mg by mouth 2 (two) times a day   furosemide (LASIX) 20 mg tablet   Yes No   Sig: Take 40 mg by mouth daily   lamoTRIgine (LaMICtal) 25 mg tablet   No No   Sig: Take 1 tablet (25 mg total) by mouth daily After 2 weeks then take 25mg po bid and further dosing per your mental health provider   mycophenolate (CELLCEPT) 250 mg capsule   Yes No   Sig: Take 1,000 mg by mouth every 12 (twelve) hours   potassium chloride (K-DUR,KLOR-CON) 20 mEq tablet   Yes No   Sig: Take 20 mEq by mouth daily   predniSONE 10 mg tablet   Yes No   Sig: Take 10 mg by mouth daily   sulfamethoxazole-trimethoprim (BACTRIM DS) 800-160 mg per tablet   Yes No   Sig: Take 1 tablet by mouth every 12 (twelve) hours   thyroid (ARMOUR) 120 MG tablet   No No   Sig: Take 1 tablet (120 mg total) by mouth daily   venlafaxine (EFFEXOR-XR) 75 mg 24 hr capsule   Yes No   Sig: Take 75 mg by mouth daily      Facility-Administered Medications: None       Past Medical History:   Diagnosis Date    Babesiosis     Bartonellosis     [facility]D [facility] )     Hypothyroidism     ILD (interstitial lung disease) ([facility] )     NSIP (nonspecific interstitial pneumonitis) (HCC)     Pulmonary fibrosis (HCC)     Pulmonary HTN ([facility] )        Past Surgical History:   Procedure Laterality Date    HYSTERECTOMY      LUNG BIOPSY      SINUS SURGERY         History reviewed  No pertinent family history  I have reviewed and agree with the history as documented      Social History   Substance Use Topics    Smoking status: Smoker, Current Status Unknown    Smokeless tobacco: Not on file    Alcohol use Yes        Review of Systems   Unable to perform ROS: Acuity of condition       Physical Exam  ED Triage Vitals   Temperature Pulse Respirations Blood Pressure SpO2   04/28/18 1834 04/28/18 1834 04/28/18 1834 04/28/18 1834 04/28/18 1834 98 °F (36 7 °C) (!) 128 18 145/78 99 %      Temp Source Heart Rate Source Patient Position - Orthostatic VS BP Location FiO2 (%)   04/28/18 1834 04/28/18 1834 04/28/18 2015 04/28/18 1834 --   Oral Monitor Lying Right arm       Pain Score       04/28/18 1834       No Pain           Orthostatic Vital Signs  Vitals:    04/28/18 1834 04/28/18 2015 04/28/18 2300   BP: 145/78 151/72 131/60   Pulse: (!) 128 85 61   Patient Position - Orthostatic VS:  Lying Lying       Physical Exam   Constitutional: She appears well-developed and well-nourished  HENT:   Head: Normocephalic and atraumatic  Eyes: Conjunctivae and EOM are normal  Pupils are equal, round, and reactive to light  No scleral icterus  Neck: Normal range of motion  Neck supple  No tracheal deviation present  Cardiovascular: Normal rate and regular rhythm  Pulmonary/Chest: Effort normal and breath sounds normal  No respiratory distress  Abdominal: Soft  She exhibits no distension  There is no tenderness  There is no rebound and no guarding  Musculoskeletal: Normal range of motion  She exhibits no tenderness or deformity  Lymphadenopathy:     She has no cervical adenopathy  Neurological: She is alert  She displays abnormal reflex  Hyper active/spastic movements of all four extremities, particularly the bilateral lower extremities  Reproducible clonus is noted bilaterally  GCS is 14 (confusion) No gross focal sensory or motor deficits  Skin: Skin is warm and dry  No rash noted  She is not diaphoretic  Psychiatric: She has a normal mood and affect  Vitals reviewed        ED Medications  Medications   cloNIDine (CATAPRES) tablet 0 2 mg (0 2 mg Oral Given 4/28/18 2011)   sodium chloride 0 9 % bolus 1,000 mL (0 mL Intravenous Stopped 4/28/18 2116)   LORazepam (ATIVAN) 2 mg/mL injection 1 mg (1 mg Intravenous Given 4/28/18 2008)   magnesium sulfate 2 g/50 mL IVPB (premix) 2 g (0 g Intravenous Stopped 4/28/18 2215)   LORazepam (ATIVAN) 2 mg/mL injection 1 mg (1 mg Intravenous Given 4/28/18 2115)       Diagnostic Studies  Results Reviewed     Procedure Component Value Units Date/Time    Lactic acid, plasma [[id number]]  (Normal) Collected:  04/28/18 2236    Lab Status:  Final result Specimen:  Blood from Arm, Right Updated:  04/28/18 2304     LACTIC ACID 1 2 mmol/L     Narrative:         Result may be elevated if tourniquet was used during collection  CK (with reflex to MB) [[id number]]  (Normal) Collected:  04/28/18 2236    Lab Status:  Final result Specimen:  Blood from Arm, Right Updated:  04/28/18 2258     Total CK 68 U/L     TSH [[id number]]  (Abnormal) Collected:  04/28/18 2126    Lab Status:  Final result Specimen:  Blood from Arm, Left Updated:  04/28/18 2203     TSH 3RD GENERATON <0 007 (L) uIU/mL     Narrative:         Patients undergoing fluorescein dye angiography may retain small amounts of fluorescein in the body for 48-72 hours post procedure  Samples containing fluorescein can produce falsely depressed TSH values  If the patient had this procedure,a specimen should be resubmitted post fluorescein clearance            The recommended reference ranges for TSH during pregnancy are as follows:  First trimester 0 1 to 2 5 uIU/mL  Second trimester  0 2 to 3 0 uIU/mL  Third trimester 0 3 to 3 0 uIU/m      Hepatic function panel [[id number]]  (Normal) Collected:  04/28/18 2008    Lab Status:  Final result Specimen:  Blood from Arm, Left Updated:  04/28/18 2045     Total Bilirubin 0 30 mg/dL      Bilirubin, Direct 0 07 mg/dL      Alkaline Phosphatase 62 U/L      AST 19 U/L      ALT 31 U/L      Total Protein 7 2 g/dL      Albumin 3 7 g/dL     Basic metabolic panel [[id number]]  (Abnormal) Collected:  04/28/18 2008    Lab Status:  Final result Specimen:  Blood from Arm, Left Updated:  04/28/18 2045     Sodium 137 mmol/L      Potassium 4 0 mmol/L      Chloride 99 (L) mmol/L      CO2 21 mmol/L      Anion Gap 17 (H) mmol/L      BUN 15 mg/dL      Creatinine 0 99 mg/dL      Glucose 118 mg/dL      Calcium 9 5 mg/dL      eGFR 67 ml/min/1 73sq m     Narrative:         National Kidney Disease Education Program recommendations are as follows:  GFR calculation is accurate only with a steady state creatinine  Chronic Kidney disease less than 60 ml/min/1 73 sq  meters  Kidney failure less than 15 ml/min/1 73 sq  meters  Magnesium [[id number]]  (Abnormal) Collected:  04/28/18 2008    Lab Status:  Final result Specimen:  Blood from Arm, Left Updated:  04/28/18 2045     Magnesium 1 4 (L) mg/dL     Troponin I [[id number]]  (Normal) Collected:  04/28/18 2007    Lab Status:  Final result Specimen:  Blood from Arm, Left Updated:  04/28/18 2042     Troponin I <0 02 ng/mL     Narrative:         [employer] Chemistry analyzer 99% cutoff is > 0 04 ng/mL in network labs    o cTnI 99% cutoff is useful only when applied to patients in the clinical setting of myocardial ischemia  o cTnI 99% cutoff should be interpreted in the context of clinical history, ECG findings and possibly cardiac imaging to establish correct diagnosis  o cTnI 99% cutoff may be suggestive but clearly not indicative of a coronary event without the clinical setting of myocardial ischemia      CBC and differential [[id number]]  (Abnormal) Collected:  04/28/18 2007    Lab Status:  Final result Specimen:  Blood from Arm, Left Updated:  04/28/18 2019     WBC 7 24 Thousand/uL      RBC 4 85 Million/uL      Hemoglobin 13 4 g/dL      Hematocrit 42 6 %      MCV 88 fL      MCH 27 6 pg      MCHC 31 5 g/dL      RDW 15 3 (H) %      MPV 10 6 fL      Platelets 735 Thousands/uL      nRBC 0 /100 WBCs      Neutrophils Relative 85 (H) %      Lymphocytes Relative 11 (L) %      Monocytes Relative 4 %      Eosinophils Relative 0 %      Basophils Relative 0 %      Neutrophils Absolute 6 12 Thousands/µL      Lymphocytes Absolute 0 81 Thousands/µL      Monocytes Absolute 0 26 Thousand/µL      Eosinophils Absolute 0 01 Thousand/µL      Basophils Absolute 0 01 Thousands/µL     Rapid drug screen, urine [[id number]]     Lab Status:  No result Specimen:  Urine     POCT pregnancy, urine [[id number]]     Lab Status:  No result                  No orders to display              Procedures  ECG 12 Lead Documentation  Date/Time: 4/28/2018 8:35 PM  Performed by: Sita Wilder  Authorized by: Lauro MARTINEZ     ECG reviewed by me, the ED Provider: yes    Patient location:  ED  Rate:     ECG rate assessment: tachycardic    Rhythm:     Rhythm: sinus rhythm    Ectopy:     Ectopy: none    QRS:     QRS axis:  Normal    QRS intervals:  Normal  Conduction:     Conduction: normal    ST segments:     ST segments:  Normal  T waves:     T waves: normal             Phone Contacts  ED Phone Contact    ED Course                               MDM  Number of Diagnoses or Management Options  Heroin withdrawal ([facility] ): new and requires workup  Thyrotoxicosis: new and requires workup  Diagnosis management comments: 47-year-old female presented for evaluation of hyperactivity and heroin withdrawal   Of note her TSH was undetectable here in the emergency department  The patient denies taking any extra thyroid supplements  She is still taking one daily tablet of thyroid armor but had previously reduced this from twice a day and is no longer taking levothyroxine  She had a similar presentation one month ago that was thought to be secondary to abuse of her thyroid supplements  It is unclear what the etiology of her hyperthyroidism is at this point  She is also likely suffering from opiate withdrawal   Discussed with [facility] Medicine, she will be admitted for inpatient treatment and further evaluation         Amount and/or Complexity of Data Reviewed  Clinical lab tests: reviewed and ordered  Review and summarize past medical records: yes  Independent visualization of images, tracings, or specimens: yes      CritCare Time    Disposition  Final diagnoses:   Heroin withdrawal ([facility] ) Thyrotoxicosis     Time reflects when diagnosis was documented in both MDM as applicable and the Disposition within this note     Time User Action Codes Description Comment    4/28/2018 11:14 PM Kaushal Espinoza Add [A35 04] Heroin withdrawal ([facility] )     4/28/2018 11:16 PM Kaushal Espinoza Add [E05 90] Thyrotoxicosis       ED Disposition     ED Disposition Condition Comment    Admit  Case was discussed with SIMRAN and the patient's admission status was agreed to be Admission Status: inpatient status to the service of Dr Amy Reinoso  Follow-up Information    None       Patient's Medications   Discharge Prescriptions    No medications on file     No discharge procedures on file      ED Provider  Electronically Signed by           Allison Kinney MD  04/28/18 6115

## 2018-04-29 PROBLEM — F11.10 HEROIN ABUSE (HCC): Status: ACTIVE | Noted: 2018-04-29

## 2018-04-29 PROBLEM — F11.23 HEROIN WITHDRAWAL (HCC): Status: ACTIVE | Noted: 2018-04-29

## 2018-04-29 LAB
AMPHETAMINES SERPL QL SCN: NEGATIVE
ANION GAP SERPL CALCULATED.3IONS-SCNC: 6 MMOL/L (ref 4–13)
BARBITURATES UR QL: NEGATIVE
BASOPHILS # BLD AUTO: 0.02 THOUSANDS/ΜL (ref 0–0.1)
BASOPHILS NFR BLD AUTO: 0 % (ref 0–1)
BENZODIAZ UR QL: POSITIVE
BUN SERPL-MCNC: 12 MG/DL (ref 5–25)
CALCIUM SERPL-MCNC: 8.3 MG/DL (ref 8.3–10.1)
CHLORIDE SERPL-SCNC: 107 MMOL/L (ref 100–108)
CO2 SERPL-SCNC: 27 MMOL/L (ref 21–32)
COCAINE UR QL: NEGATIVE
CREAT SERPL-MCNC: 0.57 MG/DL (ref 0.6–1.3)
EOSINOPHIL # BLD AUTO: 0.08 THOUSAND/ΜL (ref 0–0.61)
EOSINOPHIL NFR BLD AUTO: 1 % (ref 0–6)
ERYTHROCYTE [DISTWIDTH] IN BLOOD BY AUTOMATED COUNT: 15.2 % (ref 11.6–15.1)
EXT PREG TEST URINE: NEGATIVE
GFR SERPL CREATININE-BSD FRML MDRD: 108 ML/MIN/1.73SQ M
GLUCOSE SERPL-MCNC: 84 MG/DL (ref 65–140)
HCT VFR BLD AUTO: 37 % (ref 34.8–46.1)
HGB BLD-MCNC: 11.5 G/DL (ref 11.5–15.4)
LYMPHOCYTES # BLD AUTO: 1.93 THOUSANDS/ΜL (ref 0.6–4.47)
LYMPHOCYTES NFR BLD AUTO: 29 % (ref 14–44)
MCH RBC QN AUTO: 27.5 PG (ref 26.8–34.3)
MCHC RBC AUTO-ENTMCNC: 31.1 G/DL (ref 31.4–37.4)
MCV RBC AUTO: 89 FL (ref 82–98)
METHADONE UR QL: NEGATIVE
MONOCYTES # BLD AUTO: 0.7 THOUSAND/ΜL (ref 0.17–1.22)
MONOCYTES NFR BLD AUTO: 11 % (ref 4–12)
NEUTROPHILS # BLD AUTO: 3.93 THOUSANDS/ΜL (ref 1.85–7.62)
NEUTS SEG NFR BLD AUTO: 59 % (ref 43–75)
NRBC BLD AUTO-RTO: 0 /100 WBCS
OPIATES UR QL SCN: POSITIVE
PCP UR QL: NEGATIVE
PLATELET # BLD AUTO: 226 THOUSANDS/UL (ref 149–390)
PMV BLD AUTO: 10.5 FL (ref 8.9–12.7)
POTASSIUM SERPL-SCNC: 4.1 MMOL/L (ref 3.5–5.3)
RBC # BLD AUTO: 4.18 MILLION/UL (ref 3.81–5.12)
SODIUM SERPL-SCNC: 140 MMOL/L (ref 136–145)
T3 SERPL-MCNC: 1 NG/ML (ref 0.6–1.8)
T3FREE SERPL-MCNC: 3.3 PG/ML (ref 2.3–4.2)
T4 FREE SERPL-MCNC: 0.92 NG/DL (ref 0.76–1.46)
T4 SERPL-MCNC: 7.4 UG/DL (ref 4.7–13.3)
THC UR QL: NEGATIVE
WBC # BLD AUTO: 6.68 THOUSAND/UL (ref 4.31–10.16)

## 2018-04-29 PROCEDURE — 84439 ASSAY OF FREE THYROXINE: CPT | Performed by: PHYSICIAN ASSISTANT

## 2018-04-29 PROCEDURE — 85025 COMPLETE CBC W/AUTO DIFF WBC: CPT | Performed by: PHYSICIAN ASSISTANT

## 2018-04-29 PROCEDURE — 84480 ASSAY TRIIODOTHYRONINE (T3): CPT | Performed by: PHYSICIAN ASSISTANT

## 2018-04-29 PROCEDURE — 86800 THYROGLOBULIN ANTIBODY: CPT | Performed by: PHYSICIAN ASSISTANT

## 2018-04-29 PROCEDURE — 84436 ASSAY OF TOTAL THYROXINE: CPT | Performed by: PHYSICIAN ASSISTANT

## 2018-04-29 PROCEDURE — 99233 SBSQ HOSP IP/OBS HIGH 50: CPT | Performed by: INTERNAL MEDICINE

## 2018-04-29 PROCEDURE — 84481 FREE ASSAY (FT-3): CPT | Performed by: PHYSICIAN ASSISTANT

## 2018-04-29 PROCEDURE — 80048 BASIC METABOLIC PNL TOTAL CA: CPT | Performed by: PHYSICIAN ASSISTANT

## 2018-04-29 PROCEDURE — 86376 MICROSOMAL ANTIBODY EACH: CPT | Performed by: PHYSICIAN ASSISTANT

## 2018-04-29 PROCEDURE — 99285 EMERGENCY DEPT VISIT HI MDM: CPT

## 2018-04-29 RX ORDER — LAMOTRIGINE 25 MG/1
25 TABLET ORAL DAILY
Status: DISCONTINUED | OUTPATIENT
Start: 2018-04-29 | End: 2018-04-30 | Stop reason: HOSPADM

## 2018-04-29 RX ORDER — BUPROPION HYDROCHLORIDE 150 MG/1
150 TABLET, EXTENDED RELEASE ORAL DAILY
Status: DISCONTINUED | OUTPATIENT
Start: 2018-04-29 | End: 2018-04-30 | Stop reason: HOSPADM

## 2018-04-29 RX ORDER — PREDNISONE 10 MG/1
10 TABLET ORAL DAILY
Status: DISCONTINUED | OUTPATIENT
Start: 2018-04-29 | End: 2018-04-30 | Stop reason: HOSPADM

## 2018-04-29 RX ORDER — FLUCONAZOLE 100 MG/1
200 TABLET ORAL 2 TIMES DAILY
Status: DISCONTINUED | OUTPATIENT
Start: 2018-04-29 | End: 2018-04-30 | Stop reason: HOSPADM

## 2018-04-29 RX ORDER — ONDANSETRON 2 MG/ML
4 INJECTION INTRAMUSCULAR; INTRAVENOUS EVERY 6 HOURS PRN
Status: DISCONTINUED | OUTPATIENT
Start: 2018-04-29 | End: 2018-04-30 | Stop reason: HOSPADM

## 2018-04-29 RX ORDER — LORAZEPAM 2 MG/ML
1 INJECTION INTRAMUSCULAR EVERY 4 HOURS PRN
Status: DISCONTINUED | OUTPATIENT
Start: 2018-04-29 | End: 2018-04-30 | Stop reason: HOSPADM

## 2018-04-29 RX ORDER — SODIUM CHLORIDE 9 MG/ML
125 INJECTION, SOLUTION INTRAVENOUS CONTINUOUS
Status: DISCONTINUED | OUTPATIENT
Start: 2018-04-29 | End: 2018-04-30 | Stop reason: HOSPADM

## 2018-04-29 RX ORDER — SULFAMETHOXAZOLE AND TRIMETHOPRIM 800; 160 MG/1; MG/1
1 TABLET ORAL EVERY 12 HOURS SCHEDULED
Status: DISCONTINUED | OUTPATIENT
Start: 2018-04-29 | End: 2018-04-30 | Stop reason: HOSPADM

## 2018-04-29 RX ORDER — FUROSEMIDE 40 MG/1
40 TABLET ORAL DAILY
Status: DISCONTINUED | OUTPATIENT
Start: 2018-04-29 | End: 2018-04-30 | Stop reason: HOSPADM

## 2018-04-29 RX ORDER — MYCOPHENOLATE MOFETIL 250 MG/1
1000 CAPSULE ORAL EVERY 12 HOURS SCHEDULED
Status: DISCONTINUED | OUTPATIENT
Start: 2018-04-29 | End: 2018-04-30 | Stop reason: HOSPADM

## 2018-04-29 RX ORDER — ACETAMINOPHEN 325 MG/1
650 TABLET ORAL EVERY 6 HOURS PRN
Status: DISCONTINUED | OUTPATIENT
Start: 2018-04-29 | End: 2018-04-30 | Stop reason: HOSPADM

## 2018-04-29 RX ORDER — DEXTROAMPHETAMINE SACCHARATE, AMPHETAMINE ASPARTATE, DEXTROAMPHETAMINE SULFATE AND AMPHETAMINE SULFATE 2.5; 2.5; 2.5; 2.5 MG/1; MG/1; MG/1; MG/1
10 TABLET ORAL
Status: DISCONTINUED | OUTPATIENT
Start: 2018-04-29 | End: 2018-04-29

## 2018-04-29 RX ORDER — VENLAFAXINE HYDROCHLORIDE 37.5 MG/1
75 CAPSULE, EXTENDED RELEASE ORAL DAILY
Status: DISCONTINUED | OUTPATIENT
Start: 2018-04-29 | End: 2018-04-30 | Stop reason: HOSPADM

## 2018-04-29 RX ORDER — POTASSIUM CHLORIDE 20 MEQ/1
20 TABLET, EXTENDED RELEASE ORAL DAILY
Status: DISCONTINUED | OUTPATIENT
Start: 2018-04-29 | End: 2018-04-30 | Stop reason: HOSPADM

## 2018-04-29 RX ADMIN — LORAZEPAM 1 MG: 2 INJECTION INTRAMUSCULAR; INTRAVENOUS at 17:25

## 2018-04-29 RX ADMIN — MYCOPHENOLATE MOFETIL 1000 MG: 250 CAPSULE ORAL at 09:05

## 2018-04-29 RX ADMIN — FUROSEMIDE 40 MG: 40 TABLET ORAL at 09:05

## 2018-04-29 RX ADMIN — SODIUM CHLORIDE 125 ML/HR: 0.9 INJECTION, SOLUTION INTRAVENOUS at 17:21

## 2018-04-29 RX ADMIN — ENOXAPARIN SODIUM 40 MG: 40 INJECTION SUBCUTANEOUS at 09:05

## 2018-04-29 RX ADMIN — FLUCONAZOLE 200 MG: 100 TABLET ORAL at 09:05

## 2018-04-29 RX ADMIN — SULFAMETHOXAZOLE AND TRIMETHOPRIM 1 TABLET: 800; 160 TABLET ORAL at 05:34

## 2018-04-29 RX ADMIN — SULFAMETHOXAZOLE AND TRIMETHOPRIM 1 TABLET: 800; 160 TABLET ORAL at 17:19

## 2018-04-29 RX ADMIN — CLONIDINE HYDROCHLORIDE 0.2 MG: 0.1 TABLET ORAL at 09:05

## 2018-04-29 RX ADMIN — MYCOPHENOLATE MOFETIL 1000 MG: 250 CAPSULE ORAL at 21:14

## 2018-04-29 RX ADMIN — VENLAFAXINE HYDROCHLORIDE 75 MG: 37.5 CAPSULE, EXTENDED RELEASE ORAL at 09:05

## 2018-04-29 RX ADMIN — MYCOPHENOLATE MOFETIL 1000 MG: 250 CAPSULE ORAL at 01:11

## 2018-04-29 RX ADMIN — LORAZEPAM 1 MG: 2 INJECTION INTRAMUSCULAR; INTRAVENOUS at 21:14

## 2018-04-29 RX ADMIN — LORAZEPAM 1 MG: 2 INJECTION INTRAMUSCULAR; INTRAVENOUS at 12:44

## 2018-04-29 RX ADMIN — LAMOTRIGINE 25 MG: 25 TABLET ORAL at 09:06

## 2018-04-29 RX ADMIN — CLONIDINE HYDROCHLORIDE 0.2 MG: 0.1 TABLET ORAL at 21:14

## 2018-04-29 RX ADMIN — ACETAMINOPHEN 650 MG: 325 TABLET, FILM COATED ORAL at 21:25

## 2018-04-29 RX ADMIN — FLUCONAZOLE 200 MG: 100 TABLET ORAL at 17:19

## 2018-04-29 RX ADMIN — BUPROPION HYDROCHLORIDE 150 MG: 150 TABLET, FILM COATED, EXTENDED RELEASE ORAL at 09:05

## 2018-04-29 RX ADMIN — PREDNISONE 10 MG: 10 TABLET ORAL at 09:05

## 2018-04-29 RX ADMIN — SODIUM CHLORIDE 125 ML/HR: 0.9 INJECTION, SOLUTION INTRAVENOUS at 00:56

## 2018-04-29 RX ADMIN — POTASSIUM CHLORIDE 20 MEQ: 1500 TABLET, EXTENDED RELEASE ORAL at 09:05

## 2018-04-29 NOTE — ASSESSMENT & PLAN NOTE
Admit telemetry  Consult endocrinology  Thyroid antibody panel pending, the free T3 and T4 and total pending

## 2018-04-29 NOTE — CASE MANAGEMENT
Initial Clinical Review    Admission: Date/Time/Statement: 4/28/18 @ 2318     Orders Placed This Encounter   Procedures    Inpatient Admission (expected length of stay for this patient is greater than two midnights)     Standing Status:   Standing     Number of Occurrences:   1     Order Specific Question:   Admitting Physician     Answer:   Claudell Manning [[id number]]     Order Specific Question:   Level of Care     Answer:   Med Surg [[id number]]     Order Specific Question:   Estimated length of stay     Answer:   More than 2 Midnights     Order Specific Question:   Certification     Answer:   I certify that inpatient services are medically necessary for this patient for a duration of greater than two midnights  See H&P and MD Progress Notes for additional information about the patient's course of treatment  ED: Date/Time/Mode of Arrival:   ED Arrival Information     Expected Arrival Acuity Means of Arrival Escorted By Service Admission Type    - 4/28/2018 19:04 Urgent Ambulance [address]  [facility] Medicine Urgent    Arrival Complaint    -          Chief Complaint:   Chief Complaint   Patient presents with    Delirium Tremens (DTS)     heroin withdrawl x 2 days  ems gave  5 mg versed enroute with no effect  pt rocking back and fourth asking for suboxone  states she was here last month for same problem   states not to believe patient       History of Illness: Eduard Mane is a 46 y o  female who presents with complaints of leg twitching  Patient states that she was admitted recently for similar symptoms which required intubation  Patient states that her arms and her legs began twitching again today  Patient denies any sweating, weight loss, pain, chest pain, shortness of breath, abdominal pain, nausea, vomiting  Patient has history of heroin abuse and states her last use was 3 days ago    Patient states that the twitching did improve after being treated with the Ativan in the emergency department        ED Vital Signs:   ED Triage Vitals   Temperature Pulse Respirations Blood Pressure SpO2   04/28/18 1834 04/28/18 1834 04/28/18 1834 04/28/18 1834 04/28/18 1834   98 °F (36 7 °C) (!) 128 18 145/78 99 %      Temp Source Heart Rate Source Patient Position - Orthostatic VS BP Location FiO2 (%)   04/28/18 1834 04/28/18 1834 04/28/18 2015 04/28/18 1834 --   Oral Monitor Lying Right arm       Pain Score       04/28/18 1834       No Pain        Wt Readings from Last 1 Encounters:   04/29/18 60 7 kg (133 lb 13 1 oz)       Vital Signs (abnormal): wnl     Abnormal Labs/Diagnostic Test Results: UDS -  + benzos, opiates, cl  99, an gap  17, mg  1 4    ED Treatment:   Medication Administration from 04/28/2018 1833 to 04/29/2018 0015       Date/Time Order Dose Route Action Action by Comments     04/28/2018 2116 sodium chloride 0 9 % bolus 1,000 mL 0 mL Intravenous Stopped Parveen Shultz RN      04/28/2018 2009 sodium chloride 0 9 % bolus 1,000 mL 1,000 mL Intravenous New Bag Parveen Shultz RN      04/28/2018 2008 LORazepam (ATIVAN) 2 mg/mL injection 1 mg 1 mg Intravenous Given Parveen Shultz RN      04/28/2018 2011 cloNIDine (CATAPRES) tablet 0 2 mg 0 2 mg Oral Given Parveen Shultz RN      04/28/2018 2215 magnesium sulfate 2 g/50 mL IVPB (premix) 2 g 0 g Intravenous Stopped Parveen Shultz RN      04/28/2018 2115 magnesium sulfate 2 g/50 mL IVPB (premix) 2 g 2 g Intravenous New Bag Parveen Shultz RN      04/28/2018 2115 LORazepam (ATIVAN) 2 mg/mL injection 1 mg 1 mg Intravenous Given Parveen Shultz RN           Past Medical/Surgical History:    Active Ambulatory Problems     Diagnosis Date Noted    Thyrotoxicosis 04/01/2018    Opioid dependence with withdrawal ([facility] ) 04/01/2018    Emphysema/COPD ([facility] ) 04/01/2018    ILD (interstitial lung disease) ([facility] ) 04/01/2018    Pulmonary HTN ([facility] ) 04/01/2018    Adrenal insufficiency ([facility] ) 04/01/2018     Resolved Ambulatory Problems     Diagnosis Date Noted    Hypertensive crisis 04/01/2018    Altered mental status 04/01/2018    Acute hypoxemic respiratory failure ([place] [address] ) 04/01/2018     Past Medical History:   Diagnosis Date    Babesiosis     Bartonellosis     Emphysema/COPD ([place] [address] )     Hypothyroidism     ILD (interstitial lung disease) ([place] [address] )     NSIP [facility]onspecific interstitial pneumonitis) [facility]     Pulmonary fibrosis (HCC)     Pulmonary HTN ([place] [address] )        Admitting Diagnosis: Thyrotoxicosis [E05 90]  DTs (delirium tremens) ([place] [address] ) [F10 231]  Heroin withdrawal ([place] [address] ) [F11 23]    Age/Sex: 46 y o  female    Assessment/Plan:   Opioid dependence with withdrawal ([place] [address] )   Assessment & Plan     Telemetry monitoring  P r n  Ativan as needed          * Thyrotoxicosis   Assessment & Plan     Admit telemetry  Consult endocrinology  Thyroid antibody panel pending, the free T3 and T4 and total pending          Pulmonary HTN (HCC)   Assessment & Plan     Respiratory support             VTE Prophylaxis: Enoxaparin (Lovenox)  / sequential compression device   Code Status:  Full  POLST: There is no POLST form on file for this patient (pre-hospital)  Discussion with family:  There is no family present for discussion     Anticipated Length of Stay:  Patient will be admitted on an Inpatient basis with an anticipated length of stay of  more than 2 midnights     Justification for Hospital Stay:  Endocrinology evaluation         Admission Orders:  Scheduled Meds:   Current Facility-Administered Medications:  acetaminophen 650 mg Oral Q6H PRN Kp Sam PA-C    buPROPion 150 mg Oral Daily Kp Sam PA-C    cloNIDine 0 2 mg Oral Q12H [facility] Didi Young MD    enoxaparin 40 mg Subcutaneous Daily Kp Sam PA-C    fluconazole 200 mg Oral BID Kp Sam PA-C    furosemide 40 mg Oral Daily Kp Sam PA-C    lamoTRIgine 25 mg Oral Daily Kp Sam PA-C    LORazepam 1 mg Intravenous Q4H PRN Kp Sam PA-C    mycophenolate 1,000 mg Oral Q12H [address] Monika Kate, PA-C    ondansetron 4 mg Intravenous Q6H PRN Monika Love PA-C    potassium chloride 20 mEq Oral Daily Monika Kate PA-C    predniSONE 10 mg Oral Daily Monika Kate, PA-C    sodium chloride 125 mL/hr Intravenous Continuous Monika Kate, PA-C Last Rate: 125 mL/hr (04/29/18 0056)   sulfamethoxazole-trimethoprim 1 tablet Oral Q12H [address] Monika Kate, PA-C    venlafaxine 75 mg Oral Daily Monika Kate, PA-C      Continuous Infusions:   sodium chloride 125 mL/hr Last Rate: 125 mL/hr (04/29/18 0056)     PRN Meds:   acetaminophen    LORazepam    ondansetron     Reg diet   SCD  Tele   Endocrinology consult   Up w/ assist   4/29 cbc , bmp, T4, T4 , thyroid antibodies panel , ua preg     IM note  4/29  Assessment/Plan:     Principal Problem:    Heroin withdrawal ([place] [address] )  Active Problems:    Thyrotoxicosis    Opioid dependence with withdrawal (HCC)    Emphysema/COPD ([facility] )    ILD (interstitial lung disease) ([facility] )    Pulmonary HTN (HCC)    Adrenal insufficiency (HCC)    Heroin abuse     Present on Admission:   Pulmonary HTN ([facility] )   Opioid dependence with withdrawal ([facility] )   Thyrotoxicosis   Em[facility]/COPD [facility] )   ILD (interstitial lung disease) [facility] )   Heroin abuse   Heroin withdrawal ([place] [address] )   Adrenal insufficiency (HCC)        · Heroin withdrawal symptoms  She continues to consume significant amount of heroin quite frequently although none for the last 3 days or so  She started having these withdrawal symptoms although she did not used to get these symptoms so severe previously  She likely also having thyrotoxicosis symptoms in addition to heroin withdrawal symptoms  She is still taking [place] Thyroid which would be held until she has follow-up levels checked on outpatient basis  She does not have a primary care physician  As per patient, her Infectious Disease physician gives her all her medications and he is in [place]    I encouraged her to see primary care physician as she has 1 listed and she is planning to see her in near future  I also offered her to get help for inpatient drug abuse treatment  Her concern is that her  would be quite upset as she has to take care of her 12years old daughter/dog/home  Offered her case management/ sport tomorrow when they are available give her some information about the resources   She probably knows quite a bit about it as she has been heroin user for over 15 years  Continue with clonidine  Her symptoms are much improved  She is asking to be discharged home soon as well  · Thyrotoxicosis  Possible secondary to her taking her medications as she was advised to decrease the dose/frequency of her Synthroid/thyroid replacement medications in the past   She states that she did that, however, I am not sure how reliable she is  Continue to hold her thyroid supplement and follow up as above  · Interstitial lung disease/pulmonary hypertension/chronic hypoxic respiratory failure-O2 dependent at home  Patient on Bactrim as prophylaxis  · Adrenal insufficiency  On chronic steroids  If her blood pressure drops, would also need to consider adrenal crisis  I think she is on Diflucan for her adrenal issues rather than anything else         VTE Pharmacologic Prophylaxis:   Pharmacologic: Enoxaparin (Lovenox)  Mechanical VTE Prophylaxis in Place: Yes     Patient Centered Rounds: I have performed bedside rounds with nursing staff today      Discussions with Specialists or Other Care Team Provider: Yes     Education and Discussions with Family / Patient: Yes     Time Spent for Care: 45+ minutes    More than 50% of total time spent on counseling and coordination of care as described above      Current Length of Stay: 1 day(s)     Current Patient Status: Inpatient   Certification Statement: The patient will continue to require additional inpatient hospital stay due to Heroin withdrawal/thyrotoxicosis     Discharge Plan:   Home when stable:

## 2018-04-29 NOTE — PROGRESS NOTES
[facility] Progress Note  Patient: Gloria Grant 46 y o  female   MRN: [id number]  PCP: No primary care provider on file  Unit/Bed#: MS [facility]-01 Encounter: [id number]  Date Of Visit: 04/29/18    Assessment/Plan:    Principal Problem:    Heroin withdrawal ([facility] [address] )  Active Problems:    Thyrotoxicosis    Opioid dependence with withdrawal ([facility] [address] )    Emphysema/COPD ([facility] [address] )    ILD (interstitial lung disease) ([facility] )    Pulmonary HTN (HCC)    Adrenal insufficiency (HCC)    Heroin abuse    Present on Admission:   Pulmonary HTN ([facility] [address] )   [facility]id dependence with withdrawal ([facility] )   Thyrotoxicosis   Emphysema/COPD ([place] [address] )   ILD (interstitial lung disease) ([facility] )   Heroin abuse   Heroin withdrawal ([facility] )   Adrenal insufficiency (HCC)      · Heroin withdrawal symptoms  She continues to consume significant amount of heroin quite frequently although none for the last 3 days or so  She started having these withdrawal symptoms although she did not used to get these symptoms so severe previously  She likely also having thyrotoxicosis symptoms in addition to heroin withdrawal symptoms  She is still taking [place] Thyroid which would be held until she has follow-up levels checked on outpatient basis  She does not have a primary care physician  As per patient, her Infectious Disease physician gives her all her medications and he is in [place]  I encouraged her to see primary care physician as she has 1 listed and she is planning to see her in near future  I also offered her to get help for inpatient drug abuse treatment  Her concern is that her  would be quite upset as she has to take care of her 12years old daughter/dog/home  Offered her case management/ sport tomorrow when they are available give her some information about the resources   She probably knows quite a bit about it as she has been heroin user for over 15 years  Continue with clonidine  Her symptoms are much improved    She is asking to be discharged home soon as well  · Thyrotoxicosis  Possible secondary to her taking her medications as she was advised to decrease the dose/frequency of her Synthroid/thyroid replacement medications in the past   She states that she did that, however, I am not sure how reliable she is  Continue to hold her thyroid supplement and follow up as above  · Interstitial lung disease/pulmonary hypertension/chronic hypoxic respiratory failure-O2 dependent at home  Patient on Bactrim as prophylaxis  · Adrenal insufficiency  On chronic steroids  If her blood pressure drops, would also need to consider adrenal crisis  I think she is on Diflucan for her adrenal issues rather than anything else  VTE Pharmacologic Prophylaxis:   Pharmacologic: Enoxaparin (Lovenox)  Mechanical VTE Prophylaxis in Place: Yes    Patient Centered Rounds: I have performed bedside rounds with nursing staff today  Discussions with Specialists or Other Care Team Provider:  Yes    Education and Discussions with Family / Patient:  Yes    Time Spent for Care: 45+ minutes  More than 50% of total time spent on counseling and coordination of care as described above  Current Length of Stay: 1 day(s)    Current Patient Status: Inpatient   Certification Statement: The patient will continue to require additional inpatient hospital stay due to Heroin withdrawal/thyrotoxicosis    Discharge Plan:   Home when stable:    Code Status: Level 1 - Full Code      Subjective:   She feels much better this morning  She was in the hospital not too long ago and restarted using heroin  Developed trembling of her legs/arms  Started going through withdrawals again  She says that she used to get withdrawal symptoms although not that bad previously  She claims that she is still taking her [place] Thyroid as instructed last time to cut back on the dose  Denies any chest pain  Has chronic issues with the lungs and chronically on oxygen      On talking to her more about her heroin use, she could not really tell me about why she started using it again and she is addicted to it for any particular symptoms/issues     Objective:     Vitals:   Temp (24hrs), Av 4 °F (36 9 °C), Min:98 °F (36 7 °C), Max:99 °F (37 2 °C)    HR:  [] 70  Resp:  [16-18] 16  BP: (131-158)/(60-78) 146/77  SpO2:  [96 %-100 %] 100 %  Body mass index is 22 97 kg/m²  Input and Output Summary (last 24 hours): Intake/Output Summary (Last 24 hours) at 18 0953  Last data filed at 18 0636   Gross per 24 hour   Intake             1650 ml   Output                0 ml   Net             1650 ml           Physical Exam:     Vital signs are reviewed as above  Constitutional:  Patient in bed  Patient laying comfortably  She is not tremulous or shaky  Eyes: EOM grossly intact  Conjunctivae slightly pale  Patient has anicteric sclera  HENT: Oropharynx are slightly dry   Did not notice any significant lesions on the tongue  Head normocephalic  Has oxygen  Neck: Neck is supple  Cardiac: I did not hear any rubs or gallop  Patient appears to be in sinus rhythm  Arthritic bili has been on the lower side now at times  Respiratory: Patient not in significant respiratory distress  Air entry in general is poor  She has oxygen  GI: Abdomen is soft  It is grossly nontender  Bowel sounds are adequate  I was not able to appreciate any hepatosplenomegaly  Neurologic:  Patient is awake and alert  Neurological examination is grossly intact  No obvious focal neurological deficit noticed  Skin: Skin is warm and dry  Psychiatric:  Does not grab everything although she is smart and understands about her addiction  Musculoskeletal  Patient moving all extremities while in bed    xtremities: Patient has no significant cyanosis, clubbing, or lower extremity edema      Additional Data:     Labs:      Results from last 7 days  Lab Units 18  0547   WBC Thousand/uL 6 68   HEMOGLOBIN g/dL 11 5 HEMATOCRIT % 37 0   PLATELETS Thousands/uL 226   NEUTROS PCT % 59   LYMPHS PCT % 29   MONOS PCT % 11   EOS PCT % 1       Results from last 7 days  Lab Units 04/29/18  0547 04/28/18 2008   SODIUM mmol/L 140 137   POTASSIUM mmol/L 4 1 4 0   CHLORIDE mmol/L 107 99*   CO2 mmol/L 27 21   BUN mg/dL 12 15   CREATININE mg/dL 0 57* 0 99   CALCIUM mg/dL 8 3 9 5   TOTAL PROTEIN g/dL  --  7 2   BILIRUBIN TOTAL mg/dL  --  0 30   ALK PHOS U/L  --  62   ALT U/L  --  31   AST U/L  --  19   GLUCOSE RANDOM mg/dL 84 118           * I Have Reviewed All Lab Data Listed Above  * Additional Pertinent Lab Tests Reviewed: All Labs Within Last 24 Hours Reviewed      Recent Cultures (last 7 days):           Last 24 Hours Medication List:     Current Facility-Administered Medications:  acetaminophen 650 mg Oral Q6H PRN Sara Schumacher PA-C    buPROPion 150 mg Oral Daily Sara Schumacher PA-C    cloNIDine 0 2 mg Oral Q12H [facility] Theresa Gallegos MD    enoxaparin 40 mg Subcutaneous Daily Sara Schumacher PA-C    fluconazole 200 mg Oral BID Sara Schumacher PA-C    furosemide 40 mg Oral Daily Sara Schumacher PA-C    lamoTRIgine 25 mg Oral Daily Sara Schumacher PA-C    LORazepam 1 mg Intravenous Q4H PRN aSra Schumacher PA-C    mycophenolate 1,000 mg Oral Q12H [facility] Sara Schuamcher PA-C    ondansetron 4 mg Intravenous Q6H PRN Sara Schumacher PA-C    potassium chloride 20 mEq Oral Daily Sara Schumacher PA-C    predniSONE 10 mg Oral Daily Sara Schumacher PA-C    sodium chloride 125 mL/hr Intravenous Continuous Sara Schumacher PA-C Last Rate: 125 mL/hr (04/29/18 0056)   sulfamethoxazole-trimethoprim 1 tablet Oral Q12H [facility] Sara Schumacher PA-C    venlafaxine 75 mg Oral Daily Sara Schumacher PA-C         Today, Patient Was Seen By: Eboni Trevino MD    ** Please Note: Dragon 360 Dictation voice to text software may have been used in the creation of this document   **

## 2018-04-29 NOTE — H&P
H&P- Elzbieta Hess 1970, 46 y o  female MRN: [id number]    Unit/Bed#: ED 15 Encounter: [id number]    Primary Care Provider: No primary care provider on file  Date and time admitted to hospital: 4/28/2018  7:04 PM        Opioid dependence with withdrawal [facility])   Assessment & Plan    Telemetry monitoring  P r n  Ativan as needed        * Thyrotoxicosis   Assessment & Plan    Admit telemetry  Consult endocrinology  Thyroid antibody panel pending, the free T3 and T4 and total pending        Pulmonary HTN ([address] )   Assessment & Plan    Respiratory support          VTE Prophylaxis: Enoxaparin (Lovenox)  / sequential compression device   Code Status:  Full  POLST: There is no POLST form on file for this patient (pre-hospital)  Discussion with family:  There is no family present for discussion    Anticipated Length of Stay:  Patient will be admitted on an Inpatient basis with an anticipated length of stay of  more than 2 midnights  Justification for Hospital Stay:  Endocrinology evaluation    Total Time for Visit, including Counseling / Coordination of Care: 30 minutes  Greater than 50% of this total time spent on direct patient counseling and coordination of care  Chief Complaint:   Leg twitching    History of Present Illness:    Elzbieta Hess is a 46 y o  female who presents with complaints of leg twitching  Patient states that she was admitted recently for similar symptoms which required intubation  Patient states that her arms and her legs began twitching again today  Patient denies any sweating, weight loss, pain, chest pain, shortness of breath, abdominal pain, nausea, vomiting  Patient has history of heroin abuse and states her last use was 3 days ago  Patient states that the twitching did improve after being treated with the Ativan in the emergency department  Review of Systems:    Review of Systems   Psychiatric/Behavioral: The patient is hyperactive      All other systems reviewed and are negative  Past Medical and Surgical History:     Past Medical History:   Diagnosis Date    Babesiosis     Bartonellosis     Emphysema/COPD ([place] [address] )     Hypothyroidism     ILD (interstitial lung disease) ([place] [address] )     NSIP (nonspecific interstitial pneumonitis) (HCC)     Pulmonary fibrosis (HCC)     Pulmonary HTN ([place] [address] )        Past Surgical History:   Procedure Laterality Date    HYSTERECTOMY      LUNG BIOPSY      SINUS SURGERY         Meds/Allergies:    Prior to Admission medications    Medication Sig Start Date End Date Taking?  Authorizing Provider   amphetamine-dextroamphetamine (ADDERALL) 20 mg tablet Take 10 mg by mouth 2 (two) times a day    Historical Provider, MD   buPROPion (WELLBUTRIN SR) 150 mg 12 hr tablet Take 150 mg by mouth daily    Historical Provider, MD   cloNIDine (CATAPRES) 0 2 mg tablet Take 1 tablet (0 2 mg total) by mouth every 12 (twelve) hours 4/3/18   Dirk Daily MD   fluconazole (DIFLUCAN) 200 mg tablet Take 200 mg by mouth 2 (two) times a day    Historical Provider, MD   furosemide (LASIX) 20 mg tablet Take 40 mg by mouth daily    Historical Provider, MD   lamoTRIgine (LaMICtal) 25 mg tablet Take 1 tablet (25 mg total) by mouth daily After 2 weeks then take 25mg po bid and further dosing per your mental health provider 4/3/18   Dirk Daily MD   mycophenolate (CELLCEPT) 250 mg capsule Take 1,000 mg by mouth every 12 (twelve) hours    Historical Provider, MD   potassium chloride (K-DUR,KLOR-CON) 20 mEq tablet Take 20 mEq by mouth daily    Historical Provider, MD   predniSONE 10 mg tablet Take 10 mg by mouth daily    Historical Provider, MD   sulfamethoxazole-trimethoprim (BACTRIM DS) 800-160 mg per tablet Take 1 tablet by mouth every 12 (twelve) hours    Historical Provider, MD   thyroid (ARMOUR) 120 MG tablet Take 1 tablet (120 mg total) by mouth daily 4/3/18   Dirk Daily MD   venlafaxine (EFFEXOR-XR) 75 mg 24 hr capsule Take 75 mg by mouth daily    Historical Provider, MD     I have reviewed home medications with patient personally  Allergies: No Known Allergies    Social History:     Marital Status: Unknown   Occupation:  Unemployed  Patient Pre-hospital Living Situation:  Lives at home  Patient Pre-hospital Level of Mobility:  Ambulatory  Patient Pre-hospital Diet Restrictions:  None  Substance Use History:   History   Alcohol Use    Yes     History   Smoking Status    Smoker, Current Status Unknown   Smokeless Tobacco    Not on file     History   Drug Use    Types: Heroin     Comment: xanax       Family History:    History reviewed  No pertinent family history  Physical Exam:     Vitals:   Blood Pressure: 131/60 (04/28/18 2300)  Pulse: 61 (04/28/18 2300)  Temperature: 98 °F (36 7 °C) (04/28/18 1834)  Temp Source: Oral (04/28/18 1834)  Respirations: 16 (04/28/18 2300)  SpO2: 96 % (04/28/18 2300)    Physical Exam   Constitutional: She is oriented to person, place, and time  She appears well-developed and well-nourished  HENT:   Head: Normocephalic and atraumatic  Right Ear: External ear normal    Left Ear: External ear normal    Nose: Nose normal    Mouth/Throat: Oropharynx is clear and moist    Eyes: Conjunctivae and EOM are normal  Pupils are equal, round, and reactive to light  Neck: Normal range of motion  Cardiovascular: Normal rate, regular rhythm and normal heart sounds  Pulmonary/Chest: Effort normal and breath sounds normal    Abdominal: Soft  Bowel sounds are normal    Musculoskeletal: Normal range of motion  Neurological: She is alert and oriented to person, place, and time  Coordination normal    Spasticity noted in arms and legs with mild clonus noted  Skin: Skin is warm and dry  Psychiatric: She has a normal mood and affect  Her behavior is normal  Judgment and thought content normal    Vitals reviewed  Additional Data:     Lab Results: I have personally reviewed pertinent reports          Results from last 7 days  Lab Units 04/28/18 2007   WBC Thousand/uL 7 24   HEMOGLOBIN g/dL 13 4   HEMATOCRIT % 42 6   PLATELETS Thousands/uL 327   NEUTROS PCT % 85*   LYMPHS PCT % 11*   MONOS PCT % 4   EOS PCT % 0       Results from last 7 days  Lab Units 04/28/18 2008   SODIUM mmol/L 137   POTASSIUM mmol/L 4 0   CHLORIDE mmol/L 99*   CO2 mmol/L 21   BUN mg/dL 15   CREATININE mg/dL 0 99   CALCIUM mg/dL 9 5   TOTAL PROTEIN g/dL 7 2   BILIRUBIN TOTAL mg/dL 0 30   ALK PHOS U/L 62   ALT U/L 31   AST U/L 19   GLUCOSE RANDOM mg/dL 118                   Imaging: I have personally reviewed pertinent reports  No orders to display       EKG, Pathology, and Other Studies Reviewed on Admission:   · EKG:  Sinus tachycardia    Allscripts / Epic Records Reviewed: Yes     ** Please Note: This note has been constructed using a voice recognition system   **

## 2018-04-29 NOTE — PHYSICIAN ADVISOR
Current patient class: Inpatient  The patient is currently on Hospital Day: 2      The patient was admitted to the hospital  on 4/28/18 at 2318 for the following diagnosis:  Thyrotoxicosis [E05 90]  DTs (delirium tremens) ([facility] [address] ) [F10 231]  Heroin withdrawal ([facility] ) [F11 23]       There is documentation in the medical record of an expected length of stay of at least 2 midnights  The patient is therefore expected to satisfy the 2 midnight benchmark and given the 2 midnight presumption is appropriate for INPATIENT ADMISSION  Given this expectation of a satisfying stay, CMS instructs us that the patient is most often appropriate for inpatient admission under part A provided medical necessity is documented in the chart  After review of the relevant documentation, labs, vital signs and test results, the patient is appropriate for INPATIENT ADMISSION  Admission to the hospital as an inpatient is a complex decision making process which requires the practitioner to consider the patients presenting complaint, history and physical examination and all relevant testing  With this in mind, in this case, the patient was deemed appropriate for INPATIENT ADMISSION  After review of the documentation and testing available at the time of the admission I concur with this clinical determination of medical necessity  The patient does have an inpatient admission within the previous 30 days  The patient was admitted on 3/31/18 and discharged on 4/3/18 as an inpatient  The patient therefore required readmission review  In this case the patient should be considered a SEPARATE and UNRELATED INPATIENT ADMISSION  The patient had been discharged in stable condition with a completed care plan  There were no unresolved acute medical issues at the time of discharged which would have reasonably been expected to prompt this readmission  Rationale is as follows:     The patient is a 46 yrs   Female who presented to the ED at 4/28/2018 7:04 PM with a chief complaint of Delirium Tremens (DTS) (heroin withdrawl x 2 days  ems gave  5 mg versed enroute with no effect  pt rocking back and fourth asking for suboxone  states she was here last month for same problem   states not to believe patient)     The patient presented with leg twitching  The patient was noted to be tachycardic in the ER with a HR of 128  The patient is being admitted with opoid dependence and withdrawal and thyrotoxicosis  The plan of care includes telemetry monitoring, prn ativan, endocrinology pending, thyroid panel   This patient is not yet stable for discharge and requires additional hospital inpatient stay for continued treatment of heroin withdrawal and thyrotoxicosis   This admission is UNRELATED to her prior as she was treated and discharged in stable condition on her prior admission    The patients vitals on arrival were ED Triage Vitals   Temperature Pulse Respirations Blood Pressure SpO2   04/28/18 1834 04/28/18 1834 04/28/18 1834 04/28/18 1834 04/28/18 1834   98 °F (36 7 °C) (!) 128 18 145/78 99 %      Temp Source Heart Rate Source Patient Position - Orthostatic VS BP Location FiO2 (%)   04/28/18 1834 04/28/18 1834 04/28/18 2015 04/28/18 1834 --   Oral Monitor Lying Right arm       Pain Score       04/28/18 1834       No Pain           Past Medical History:   Diagnosis Date    Babesiosis     Bartonellosis     [facility] )     Hypothyroidism     ILD (interstitial lung disease) ([facility] )     NSIP (nonspecific interstitial pneumonitis) (HCC)     Pulmonary fibrosis (HCC)     [facility]      Past Surgical History:   Procedure Laterality Date    HYSTERECTOMY      LUNG BIOPSY      SINUS SURGERY             Consults have been placed to:   IP CONSULT TO ENDOCRINOLOGY    Vitals:    04/29/18 0000 04/29/18 0023 04/29/18 0300 04/29/18 0700   BP: 155/73 143/67 158/78 146/77   BP Location: Right arm Right arm Right arm Right arm   Pulse: 63 55 (!) 53 70 Resp: 16 17 17 16   Temp:  99 °F (37 2 °C) 98 °F (36 7 °C) 98 4 °F (36 9 °C)   TempSrc:  Oral Oral Oral   SpO2: 97% 98% 100% 100%   Weight:  60 7 kg (133 lb 13 1 oz)     Height:  5' 4" (1 626 m)         Most recent labs:    Recent Labs      04/28/18 2007 04/28/18 2008 04/28/18 2236 04/29/18   0547   WBC  7 24   --    --    --   6 68   HGB  13 4   --    --    --   11 5   HCT  42 6   --    --    --   37 0   PLT  327   --    --    --   226   K   --    < >  4 0   --   4 1   NA   --    < >  137   --   140   CALCIUM   --    < >  9 5   --   8 3   BUN   --    < >  15   --   12   CREATININE   --    < >  0 99   --   0 57*   TROPONINI  <0 02   --    --    --    --    CKTOTAL   --    --    --   68   --    AST   --    --   19   --    --    ALT   --    --   31   --    --    ALKPHOS   --    --   62   --    --    BILITOT   --    --   0 30   --    --     < > = values in this interval not displayed         Scheduled Meds:  Current Facility-Administered Medications:  acetaminophen 650 mg Oral Q6H PRN Clair Poplin, PA-C    buPROPion 150 mg Oral Daily Clair Poplin, PA-C    cloNIDine 0 2 mg Oral Q12H [address] Shankar Camejo MD    enoxaparin 40 mg Subcutaneous Daily Clair Poplin, PA-C    fluconazole 200 mg Oral BID Clair Poplin, PA-C    furosemide 40 mg Oral Daily Clair Poplin, PA-C    lamoTRIgine 25 mg Oral Daily Clair Poplin, PA-C    LORazepam 1 mg Intravenous Q4H PRN Clair Poplin, PA-C    mycophenolate 1,000 mg Oral Q12H [address] Clair Poplin, PA-C    ondansetron 4 mg Intravenous Q6H PRN Clair Poplin, PA-C    potassium chloride 20 mEq Oral Daily Clair Poplin, PA-C    predniSONE 10 mg Oral Daily Clair Poplin, PA-C    sodium chloride 125 mL/hr Intravenous Continuous Clair Poplin, PA-C Last Rate: 125 mL/hr (04/29/18 0056)   sulfamethoxazole-trimethoprim 1 tablet Oral Q12H [address] Clair Schwartz PA-C    venlafaxine 75 mg Oral Daily Clair Schwartz PA-C      Continuous Infusions:  sodium chloride 125 mL/hr Last Rate: 125 mL/hr (04/29/18 0056)     PRN Meds:   acetaminophen    LORazepam    ondansetron

## 2018-04-30 VITALS
SYSTOLIC BLOOD PRESSURE: 162 MMHG | HEART RATE: 63 BPM | BODY MASS INDEX: 22.85 KG/M2 | HEIGHT: 64 IN | TEMPERATURE: 98 F | WEIGHT: 133.82 LBS | RESPIRATION RATE: 18 BRPM | DIASTOLIC BLOOD PRESSURE: 89 MMHG | OXYGEN SATURATION: 99 %

## 2018-04-30 LAB
ATRIAL RATE: 120 BPM
P AXIS: 92 DEGREES
PR INTERVAL: 80 MS
QRS AXIS: 34 DEGREES
QRSD INTERVAL: 116 MS
QT INTERVAL: 318 MS
QTC INTERVAL: 449 MS
T WAVE AXIS: 200 DEGREES
VENTRICULAR RATE: 120 BPM

## 2018-04-30 PROCEDURE — 99239 HOSP IP/OBS DSCHRG MGMT >30: CPT | Performed by: INTERNAL MEDICINE

## 2018-04-30 PROCEDURE — 93010 ELECTROCARDIOGRAM REPORT: CPT | Performed by: INTERNAL MEDICINE

## 2018-04-30 RX ORDER — LEVOTHYROXINE AND LIOTHYRONINE 76; 18 UG/1; UG/1
120 TABLET ORAL DAILY
Refills: 0
Start: 2018-05-07

## 2018-04-30 RX ADMIN — POTASSIUM CHLORIDE 20 MEQ: 1500 TABLET, EXTENDED RELEASE ORAL at 09:09

## 2018-04-30 RX ADMIN — LORAZEPAM 1 MG: 2 INJECTION INTRAMUSCULAR; INTRAVENOUS at 06:21

## 2018-04-30 RX ADMIN — ACETAMINOPHEN 650 MG: 325 TABLET, FILM COATED ORAL at 06:21

## 2018-04-30 RX ADMIN — LORAZEPAM 1 MG: 2 INJECTION INTRAMUSCULAR; INTRAVENOUS at 10:47

## 2018-04-30 RX ADMIN — LORAZEPAM 1 MG: 2 INJECTION INTRAMUSCULAR; INTRAVENOUS at 01:46

## 2018-04-30 RX ADMIN — BUPROPION HYDROCHLORIDE 150 MG: 150 TABLET, FILM COATED, EXTENDED RELEASE ORAL at 09:09

## 2018-04-30 RX ADMIN — FLUCONAZOLE 200 MG: 100 TABLET ORAL at 09:08

## 2018-04-30 RX ADMIN — CLONIDINE HYDROCHLORIDE 0.2 MG: 0.1 TABLET ORAL at 09:09

## 2018-04-30 RX ADMIN — SULFAMETHOXAZOLE AND TRIMETHOPRIM 1 TABLET: 800; 160 TABLET ORAL at 06:03

## 2018-04-30 RX ADMIN — VENLAFAXINE HYDROCHLORIDE 75 MG: 37.5 CAPSULE, EXTENDED RELEASE ORAL at 09:07

## 2018-04-30 RX ADMIN — FUROSEMIDE 40 MG: 40 TABLET ORAL at 09:09

## 2018-04-30 RX ADMIN — ENOXAPARIN SODIUM 40 MG: 40 INJECTION SUBCUTANEOUS at 09:09

## 2018-04-30 RX ADMIN — SODIUM CHLORIDE 125 ML/HR: 0.9 INJECTION, SOLUTION INTRAVENOUS at 00:46

## 2018-04-30 RX ADMIN — LAMOTRIGINE 25 MG: 25 TABLET ORAL at 09:09

## 2018-04-30 RX ADMIN — PREDNISONE 10 MG: 10 TABLET ORAL at 09:09

## 2018-04-30 RX ADMIN — MYCOPHENOLATE MOFETIL 1000 MG: 250 CAPSULE ORAL at 09:12

## 2018-04-30 NOTE — SOCIAL WORK
Spoke with patient in her room and she states that she lives with her  in a house with no steps  She is independent with ADL's and ambulation  She has no DME at home except for home O2 which she receives from [facility]  She purchases her medications from [employer] in [address]  She states that there is no history of mental illness but does have a heroin addiction which she is now using the drug frequently  I asked her if she would like information on drug rehab or drug addiction help agencies, and she denied  States I can not do that right now  Asked her a second time and she denied any help with the addiction  She has no POA  She drives and her  drives and will transport her home at DC  CM reviewed discharge planning process including the following: identifying help at home, patient preference for discharge planning needs, pharmacy preference, and availability of treatment team to discuss questions or concerns patient and/or family may have regarding understanding medications and recognizing signs and symptoms once discharged  CM also encouraged patient to follow up with all recommended appointments after discharge  Patient advised of importance for patient and family to participate in managing patients medical well being  CM name and role reviewed and Discharge Checklist provided  Encouraged patient and caregiver to review prior to discharge

## 2018-04-30 NOTE — SOCIAL WORK
Per [facility] and nurse is IVDU  Pt reportedly uses 10 bags of heroin per day  Per [facility] and nurse pt would like to dc home today to take care of her daughter  HARSHIL Darnell met with pt and she refused substance abuse resources  CM phoned [employer] and [facility] [phone]  to make report  Pt has no other needs at this time

## 2018-04-30 NOTE — DISCHARGE SUMMARY
Discharge Summary - [facility] Internal Medicine    Patient Information: Gudelia Cantu 46 y o  female MRN: [id number]  Unit/Bed#: -01 Encounter: [id number]    Discharging Physician / Practitioner: Brandt Valentine MD  PCP: No primary care provider on file  Admission Date: 4/28/2018  Discharge Date: 04/30/18    Reason for Admission:  Thyrotoxicosis    Discharge Diagnoses:     Principal Problem:    Heroin withdrawal ([address] )  Active Problems:    Thyrotoxicosis    Opioid dependence with withdrawal ([address] )    Emphysema/COPD ([address] )    ILD (interstitial lung disease) ([address] )    Pulmonary HTN (HCC)    Adrenal insufficiency (HCC)    Heroin abuse  Resolved Problems:    * No resolved hospital problems  *    Present on Admission:   Pulmonary HTN ([address] )   Opioid dependence with withdrawal ([address] )   Thyrotoxicosis   Emphysema/COPD ([address] )   ILD (interstitial lung disease) ([address] )   Heroin abuse   Heroin withdrawal ([address] )   Adrenal insufficiency ([address] )    Consultations During Hospital Stay:  · None    Procedures Performed:     · None    Significant Findings:     · None    Incidental Findings:   · None     Test Results Pending at Discharge (will require follow up): · None     Outpatient Tests Requested: Follow-up thyroid functions as her [place] Thyroid on hold because of significantly/continue to low TSH/hyperthyroidism    Complications:  None    Hospital Course:     Gudelia Cantu is a 46 y o  female patient who originally presented to the hospital on 4/28/2018 due to wants more with trembling of her body as she stopped using her heroin  and tried some Suboxone  It was thought that she may be having thyrotoxic issues again  She was advised to discontinue her Synthroid and only to continue her [place] Thyroid and follow up with her primary care physician/Infectious Disease consultant who takes care of her medications  I do not believe that she followed up with anyone    Further questioning revealed that she likely having withdrawal from her heroin rather than thyrotoxicosis although her TSH again came back very low  I have advised her to hold her [place] Thyroid for few days and then have her thyroid functions checked and followed up with her physicians to further adjust her thyroid medications  She was encouraged to quit using any street drugs  She would resume her other home medications on discharge    Condition at Discharge: fair     Discharge Day Visit / Exam:     Subjective:  Feels good  No further shakiness  Vitals: Blood Pressure: 162/89 (04/30/18 0700)  Pulse: 63 (04/30/18 0700)  Temperature: 98 °F (36 7 °C) (04/30/18 0700)  Temp Source: Oral (04/30/18 0700)  Respirations: 18 (04/30/18 0700)  Height: 5' 4" (162 6 cm) (04/29/18 0023)  Weight - Scale: 60 7 kg (133 lb 13 1 oz) (04/29/18 0023)  SpO2: 99 % (04/30/18 0700)  Exam:        Vital signs are reviewed as above  Constitutional:  Patient in bed  Patient laying comfortably  Respiratory:  She has poor air entry in general because of chronic respiratory issues-she is on oxygen chronically with underlying interstitial lung disease  S1 and S2 are audible  Awake and alert  Oriented x3  Not shaky or having any withdrawal symptoms  Abdomen is soft  It is nontender  Bowel sounds are audible            Discharge instructions/Information to patient and family:   See after visit summary for information provided to patient and family  Provisions for Follow-Up Care:  See after visit summary for information related to follow-up care and any pertinent home health orders  Disposition:     Home    For Discharges to [facility]:   · Not Applicable to this Patient - Not Applicable to this Patient    Planned Readmission:  None     Discharge Statement:  I spent 32+ minutes discharging the patient  This time was spent on the day of discharge  I had direct contact with the patient on the day of discharge   Greater than 50% of the total time was spent examining patient, answering all patient questions, arranging and discussing plan of care with patient as well as directly providing post-discharge instructions  Additional time then spent on discharge activities  Discharge Medications:  See after visit summary for reconciled discharge medications provided to patient and family  ** Please Note: Dragon 360 Dictation voice to text software may have been used in the creation of this document   **

## 2018-04-30 NOTE — PLAN OF CARE
Problem: DISCHARGE PLANNING  Goal: Discharge to home or other facility with appropriate resources  INTERVENTIONS:  - Identify barriers to discharge w/patient and caregiver  - Arrange for needed discharge resources and transportation as appropriate  - Identify discharge learning needs (meds, wound care, etc )  - Arrange for interpretive services to assist at discharge as needed  - Refer to Case Management Department for coordinating discharge planning if the patient needs post-hospital services based on physician/advanced practitioner order or complex needs related to functional status, cognitive ability, or social support system   Outcome: Progressing  Spoke with patient in her room and she states that she lives with her  in a house with no steps  She is independent with ADL's and ambulation  She has no DME at home except for home O2 which she receives from [employer]  She purchases her medications from [employer] in [facility]  She states that there is no history of mental illness but does have a heroin addiction which she is now using the drug frequently  I asked her if she would like information on drug rehab or drug addiction help agencies, and she denied  States I can not do that right now  Asked her a second time and she denied any help with the addiction  She has no POA  She drives and her  drives and will transport her home at [place]

## 2018-05-01 LAB
THYROGLOB AB SERPL-ACNC: <1 IU/ML (ref 0–0.9)
THYROPEROXIDASE AB SERPL-ACNC: 10 IU/ML (ref 0–34)

## 2022-07-10 NOTE — RESPIRATORY THERAPY NOTE
51 yo female admitted for possible drug overdose  Pt became uncontrollable and was intubated for airway protection and respiratory support  Pt remains sedated and on full mechanical support  Will continue to monitor 
RT Protocol Note  Js Bales 50 y o  female MRN: [id number]  Unit/Bed#:  Encounter: [id number]    Assessment  Intubated for Pt management  No HX of pulmonary  Active Problems:    * No active hospital problems  *      Home Pulmonary Medications:  None    Past Medical History:   Diagnosis Date    Emphysema/COPD ([facility] )     Pulmonary fibrosis ([facility] )      Social History     Social History    Marital status: N/A     Spouse name: N/A    Number of children: N/A    Years of education: N/A     Social History Main Topics    Smoking status: Smoker, Current Status Unknown    Smokeless tobacco: Not on file    Alcohol use Yes    Drug use: Yes     Types: Heroin      Comment: xanax    Sexual activity: Not on file     Other Topics Concern    Not on file     Social History Narrative    No narrative on file       Subjective         Objective    Physical Exam:   Assessment Type: Assess only  General Appearance: Sedated  Respiratory Pattern: Assisted  Chest Assessment: Chest expansion symmetrical  Bilateral Breath Sounds: Clear, Rhonchi  Cough: Non-productive  Suction: ET Tube (Not at thie time  )  O2 Device: Vent    Vitals:  Blood pressure (!) 182/83, pulse 87, temperature 97 9 °F (36 6 °C), temperature source Oral, resp  rate (!) 26, weight 67 8 kg (149 lb 7 6 oz), SpO2 100 %  Imaging and other studies: I have personally reviewed pertinent reports  O2 Device: Vent     Plan    Respiratory Plan: Vent/NIV/HFNC        Resp Comments: Pt  intubated in ED 13   #8 0 ETTsecured at 22 at the lips  Tube placement verified via auscultation and EtCO2  Pt  placed on Revel transport vent at AC-25/360/50%/+5 PEEP   Pt  to be transported to CT Scan
RT Ventilator Management Note  Js Bales 50 y o  female MRN: [id number]  Unit/Bed#: ED 13 Encounter: [id number]      Daily Screen     No data found  Physical Exam:   Assessment Type: Assess only  General Appearance: Sedated  Respiratory Pattern: Assisted  Chest Assessment: Chest expansion symmetrical  Bilateral Breath Sounds: Clear, Rhonchi  Cough: Non-productive  Suction: ET Tube (Not at thie time  )  O2 Device: Vent      Resp Comments: Pt  intubated in ED 13   #8 0 ETTsecured at 22 at the lips  Tube placement verified via auscultation and EtCO2  Pt  placed on Revel transport vent at AC-25/360/50%/+5 PEEP   Pt  to be transported to CT Scan
[de-identified] : AP and lateral spine radiographs were ordered, obtained, and independently reviewed in clinic on 06/23/2022 depicting scoliosis with a left upper thoracic curve of 17 degrees, right thoracic curve of 32 degrees, and thoracolumbar curve of 26 degrees. Patient is Risser 5. There is mildly increased kyphosis indicated on lateral films. No evidence of spondylolysis or spondylolisthesis.

## 2025-03-17 NOTE — PLAN OF CARE
DISCHARGE PLANNING     Discharge to home or other facility with appropriate resources Progressing        INFECTION - ADULT     Absence or prevention of progression during hospitalization Progressing     Absence of fever/infection during neutropenic period Progressing        Knowledge Deficit     Patient/family/caregiver demonstrates understanding of disease process, treatment plan, medications, and discharge instructions Progressing        Nutrition/Hydration-ADULT     Nutrient/Hydration intake appropriate for improving, restoring or maintaining nutritional needs Progressing        PAIN - ADULT     Verbalizes/displays adequate comfort level or baseline comfort level Progressing        Potential for Falls     Patient will remain free of falls Progressing        Prexisting or High Potential for Compromised Skin Integrity     Skin integrity is maintained or improved Progressing        SAFETY ADULT     Maintain or return to baseline ADL function Progressing     Maintain or return mobility status to optimal level Progressing        SAFETY,RESTRAINT: NV/NON-SELF DESTRUCTIVE BEHAVIOR     Remains free of harm/injury (restraint for non violent/non self-detsructive behavior) Progressing     Returns to optimal restraint-free functioning Progressing Mom of patient notified of Dr Luna' comments     Mom verbalized understanding of all information given and had no further questions